# Patient Record
Sex: FEMALE | Race: WHITE | HISPANIC OR LATINO | Employment: FULL TIME | URBAN - METROPOLITAN AREA
[De-identification: names, ages, dates, MRNs, and addresses within clinical notes are randomized per-mention and may not be internally consistent; named-entity substitution may affect disease eponyms.]

---

## 2017-01-17 ENCOUNTER — HOSPITAL ENCOUNTER (EMERGENCY)
Facility: HOSPITAL | Age: 37
Discharge: HOME | End: 2017-01-17
Admitting: EMERGENCY MEDICINE
Payer: MEDICARE

## 2017-01-17 VITALS
TEMPERATURE: 98.1 F | WEIGHT: 176.8 LBS | RESPIRATION RATE: 18 BRPM | OXYGEN SATURATION: 96 % | SYSTOLIC BLOOD PRESSURE: 132 MMHG | HEART RATE: 104 BPM | DIASTOLIC BLOOD PRESSURE: 78 MMHG

## 2017-01-17 DIAGNOSIS — N39.0 UTI (URINARY TRACT INFECTION): Primary | ICD-10-CM

## 2017-01-17 LAB
BACTERIA UR QL AUTO: ABNORMAL /HPF
BILIRUB UR QL STRIP: ABNORMAL
CLARITY UR: CLEAR
COLOR UR: ABNORMAL
GLUCOSE UR STRIP-MCNC: ABNORMAL MG/DL
HCG UR QL: NEGATIVE
HGB UR QL STRIP.AUTO: ABNORMAL
KETONES UR STRIP-MCNC: ABNORMAL MG/DL
LEUKOCYTE ESTERASE UR QL STRIP: ABNORMAL
NITRITE UR QL STRIP: POSITIVE
NON-SQ EPI CELLS URNS QL MICRO: ABNORMAL /HPF
PH UR STRIP.AUTO: 5 [PH] (ref 4.5–8)
PROT UR STRIP-MCNC: >=300 MG/DL
RBC #/AREA URNS AUTO: ABNORMAL /HPF
SP GR UR STRIP.AUTO: 1.02 (ref 1–1.03)
UROBILINOGEN UR QL STRIP.AUTO: 4 E.U./DL
WBC #/AREA URNS AUTO: ABNORMAL /HPF

## 2017-01-17 PROCEDURE — 81025 URINE PREGNANCY TEST: CPT

## 2017-01-17 PROCEDURE — 81001 URINALYSIS AUTO W/SCOPE: CPT

## 2017-01-17 PROCEDURE — 87086 URINE CULTURE/COLONY COUNT: CPT

## 2017-01-17 PROCEDURE — 87147 CULTURE TYPE IMMUNOLOGIC: CPT

## 2017-01-17 PROCEDURE — 99283 EMERGENCY DEPT VISIT LOW MDM: CPT

## 2017-01-17 PROCEDURE — 81002 URINALYSIS NONAUTO W/O SCOPE: CPT

## 2017-01-17 RX ORDER — PHENAZOPYRIDINE HYDROCHLORIDE 100 MG/1
100 TABLET, FILM COATED ORAL 3 TIMES DAILY PRN
Qty: 6 TABLET | Refills: 0 | Status: SHIPPED | OUTPATIENT
Start: 2017-01-17 | End: 2017-01-19

## 2017-01-17 RX ORDER — CEPHALEXIN 500 MG/1
500 CAPSULE ORAL 4 TIMES DAILY
Qty: 20 CAPSULE | Refills: 0 | Status: SHIPPED | OUTPATIENT
Start: 2017-01-17 | End: 2017-01-22

## 2017-01-18 LAB — BACTERIA UR CULT: NORMAL

## 2017-06-08 ENCOUNTER — HOSPITAL ENCOUNTER (EMERGENCY)
Facility: HOSPITAL | Age: 37
Discharge: HOME | End: 2017-06-08
Attending: EMERGENCY MEDICINE | Admitting: EMERGENCY MEDICINE
Payer: MEDICARE

## 2017-06-08 ENCOUNTER — APPOINTMENT (EMERGENCY)
Dept: RADIOLOGY | Facility: HOSPITAL | Age: 37
End: 2017-06-08
Payer: MEDICARE

## 2017-06-08 VITALS
HEART RATE: 109 BPM | DIASTOLIC BLOOD PRESSURE: 69 MMHG | OXYGEN SATURATION: 100 % | RESPIRATION RATE: 18 BRPM | SYSTOLIC BLOOD PRESSURE: 139 MMHG | WEIGHT: 171.8 LBS | TEMPERATURE: 98.5 F

## 2017-06-08 DIAGNOSIS — R07.89 MUSCULOSKELETAL CHEST PAIN: ICD-10-CM

## 2017-06-08 DIAGNOSIS — L40.9 PSORIASIS: Primary | ICD-10-CM

## 2017-06-08 LAB
ATRIAL RATE: 98 BPM
P AXIS: 64 DEGREES
PR INTERVAL: 148 MS
QRS AXIS: 30 DEGREES
QRSD INTERVAL: 80 MS
QT INTERVAL: 352 MS
QTC INTERVAL: 449 MS
SPECIMEN SOURCE: NORMAL
T WAVE AXIS: 26 DEGREES
TROPONIN I BLD-MCNC: 0 NG/ML (ref 0–0.08)
VENTRICULAR RATE: 98 BPM

## 2017-06-08 PROCEDURE — 71020 HB CHEST X-RAY 2VW FRONTAL&LATL: CPT

## 2017-06-08 PROCEDURE — 93005 ELECTROCARDIOGRAM TRACING: CPT | Performed by: EMERGENCY MEDICINE

## 2017-06-08 PROCEDURE — 99284 EMERGENCY DEPT VISIT MOD MDM: CPT

## 2017-06-08 PROCEDURE — 84484 ASSAY OF TROPONIN QUANT: CPT

## 2017-06-08 RX ORDER — ACETAMINOPHEN 325 MG/1
650 TABLET ORAL ONCE
Status: DISCONTINUED | OUTPATIENT
Start: 2017-06-08 | End: 2017-06-08 | Stop reason: HOSPADM

## 2017-06-08 RX ORDER — CETIRIZINE HYDROCHLORIDE 5 MG/1
5 TABLET ORAL DAILY
Qty: 30 TABLET | Refills: 0 | Status: SHIPPED | OUTPATIENT
Start: 2017-06-08 | End: 2018-04-14

## 2017-06-08 RX ORDER — DIAPER,BRIEF,INFANT-TODD,DISP
1 EACH MISCELLANEOUS 2 TIMES DAILY
Qty: 30 G | Refills: 0 | Status: SHIPPED | OUTPATIENT
Start: 2017-06-08 | End: 2018-04-14

## 2018-04-14 ENCOUNTER — HOSPITAL ENCOUNTER (EMERGENCY)
Facility: HOSPITAL | Age: 38
Discharge: HOME | End: 2018-04-14
Attending: EMERGENCY MEDICINE | Admitting: EMERGENCY MEDICINE
Payer: MEDICARE

## 2018-04-14 VITALS
TEMPERATURE: 97.8 F | HEART RATE: 99 BPM | SYSTOLIC BLOOD PRESSURE: 116 MMHG | OXYGEN SATURATION: 99 % | WEIGHT: 160 LBS | RESPIRATION RATE: 20 BRPM | DIASTOLIC BLOOD PRESSURE: 56 MMHG

## 2018-04-14 DIAGNOSIS — L23.9 ALLERGIC DERMATITIS: Primary | ICD-10-CM

## 2018-04-14 LAB — EXT PREG TEST URINE: NORMAL

## 2018-04-14 PROCEDURE — 81025 URINE PREGNANCY TEST: CPT | Performed by: PHYSICIAN ASSISTANT

## 2018-04-14 PROCEDURE — 99283 EMERGENCY DEPT VISIT LOW MDM: CPT

## 2018-04-14 RX ORDER — PREDNISONE 20 MG/1
60 TABLET ORAL ONCE
Status: DISCONTINUED | OUTPATIENT
Start: 2018-04-14 | End: 2018-04-14 | Stop reason: HOSPADM

## 2018-04-14 RX ORDER — TRIAMCINOLONE ACETONIDE 5 MG/G
1 CREAM TOPICAL 3 TIMES DAILY
Qty: 30 G | Refills: 0 | Status: SHIPPED | OUTPATIENT
Start: 2018-04-14 | End: 2018-08-20 | Stop reason: ALTCHOICE

## 2018-04-14 RX ORDER — DIPHENHYDRAMINE HCL 25 MG
25 TABLET ORAL ONCE
Status: DISCONTINUED | OUTPATIENT
Start: 2018-04-14 | End: 2018-04-14 | Stop reason: HOSPADM

## 2018-04-14 NOTE — ED NOTES
Went to administer medications to pt  Pt instantly became angry when informing her she will be given benadryl and prednisone  Pt reports she has these medications at home  Asked pt why she had prednisone at home as it typically is to be finished  Pt states, "Well I have like 3 left "  I want something that is gonna clear this up and points to her face  Pt asking to speak with Emily Ford PA-C  Provider aware  Pt out to nurse's station immediately asking if provider aware  Pt refusing medications and states she just wants a cream to go home with        Carol Ledbetter RN  04/14/18 2815

## 2018-04-14 NOTE — DISCHARGE INSTRUCTIONS
Contact Dermatitis   WHAT YOU NEED TO KNOW:   Contact dermatitis is a skin rash  It develops when you touch something that irritates your skin or causes an allergic reaction  DISCHARGE INSTRUCTIONS:   Call 911 for any of the following:   · You have sudden trouble breathing  · Your throat swells and you have trouble eating  · Your face is swollen  Contact your healthcare provider if:   · You have a fever  · Your blisters are draining pus  · Your rash spreads or does not get better, even after treatment  · You have questions or concerns about your condition or care  Medicines:   · Medicines  help decrease itching and swelling  They will be given as a topical medicine to apply to your rash or as a pill  · Take your medicine as directed  Contact your healthcare provider if you think your medicine is not helping or if you have side effects  Tell him or her if you are allergic to any medicine  Keep a list of the medicines, vitamins, and herbs you take  Include the amounts, and when and why you take them  Bring the list or the pill bottles to follow-up visits  Carry your medicine list with you in case of an emergency  Manage contact dermatitis:   · Take short baths or showers in cool water  Use mild soap or soap-free cleansers  Add oatmeal, baking soda, or cornstarch to the bath water to help decrease skin irritation  · Avoid skin irritants , such as makeup, hair products, soaps, and cleansers  Use products that do not contain perfume or dye  · Apply a cool compress to your rash  This will help soothe your skin  · Keep your skin moist   Rub unscented cream or lotion on your skin to prevent dryness and itching  Do this right after a bath or shower when your skin is still damp  Follow up with your healthcare provider or dermatologist in 2 to 3 days:  Write down your questions so you remember to ask them during your visits     © 2017 [employer] Information is for End User's use only and may not be sold, redistributed or otherwise used for commercial purposes  All illustrations and images included in CareNotes® are the copyrighted property of [employer]  or Robby Chan  The above information is an  only  It is not intended as medical advice for individual conditions or treatments  Talk to your doctor, nurse or pharmacist before following any medical regimen to see if it is safe and effective for you

## 2018-04-14 NOTE — ED PROVIDER NOTES
History  Chief Complaint   Patient presents with    Rash     generalized psoriasis, reports increased itchiness  Has an appointment with dermatologist this week  The patient is a 78-year-old female presents for evaluation rash  Patient states that she has a rash or a few days and is affecting her face primarily and sporadically throughout the body  The rash is acutely itchy  Denies any foods, clothing, medications, or cosmetics  Patient states she has a diagnosis of psoriasis but does not take any medications for it  None       Past Medical History:   Diagnosis Date    Blind left eye     Prosthetic eye globe        Past Surgical History:   Procedure Laterality Date    EYE SURGERY         History reviewed  No pertinent family history  I have reviewed and agree with the history as documented  Social History   Substance Use Topics    Smoking status: Current Every Day Smoker     Packs/day: 0 25     Types: Cigarettes    Smokeless tobacco: Never Used    Alcohol use Yes      Comment: occasional         Review of Systems   Constitutional: Negative for activity change, appetite change and fatigue  HENT: Negative for nosebleeds, sneezing, sore throat, trouble swallowing and voice change  Eyes: Negative for photophobia, pain and visual disturbance  Respiratory: Negative for apnea, choking and stridor  Cardiovascular: Negative for palpitations and leg swelling  Gastrointestinal: Negative for anal bleeding and constipation  Endocrine: Negative for cold intolerance, heat intolerance, polydipsia and polyphagia  Genitourinary: Negative for decreased urine volume, enuresis, frequency, genital sores and urgency  Musculoskeletal: Negative for joint swelling and myalgias  Allergic/Immunologic: Negative for environmental allergies and food allergies  Neurological: Negative for tremors, seizures, speech difficulty and weakness  Hematological: Negative for adenopathy  Psychiatric/Behavioral: Negative for behavioral problems, decreased concentration, dysphoric mood and hallucinations  Physical Exam  ED Triage Vitals [04/14/18 1531]   Temperature Pulse Respirations Blood Pressure SpO2   97 8 °F (36 6 °C) 99 20 116/56 99 %      Temp Source Heart Rate Source Patient Position - Orthostatic VS BP Location FiO2 (%)   Temporal Monitor Lying Right arm --      Pain Score       --           Orthostatic Vital Signs  Vitals:    04/14/18 1531   BP: 116/56   Pulse: 99   Patient Position - Orthostatic VS: Lying       Physical Exam   Constitutional: She is oriented to person, place, and time  She appears well-developed and well-nourished  No distress  HENT:   Head: Normocephalic and atraumatic  Right Ear: External ear normal    Left Ear: External ear normal    Nose: Nose normal    Mouth/Throat: Oropharynx is clear and moist    Eyes: Conjunctivae and EOM are normal  Pupils are equal, round, and reactive to light  Neck: Normal range of motion  Neck supple  Cardiovascular: Normal rate, regular rhythm and normal heart sounds  Exam reveals no gallop and no friction rub  No murmur heard  Pulmonary/Chest: Effort normal and breath sounds normal  No respiratory distress  She has no wheezes  Abdominal: Soft  Bowel sounds are normal    Neurological: She is alert and oriented to person, place, and time  Skin: Skin is warm and dry  Rash noted  She is not diaphoretic  There is a papular rash along the bilateral cheeks and sporadically throughout the upper extremities and a few spots on the right lower extremity  He has areas appear to be lichenified and the patient exhibits pruritus  No areas of cellulitis  Psychiatric: She has a normal mood and affect  Her behavior is normal    Vitals reviewed        ED Medications  Medications - No data to display    Diagnostic Studies  Results Reviewed     Procedure Component Value Units Date/Time    POCT pregnancy, urine [[id number]]  (Normal) Resulted:  04/14/18 1547    Lab Status:  Final result Updated:  04/14/18 1547     EXT PREG TEST UR (Ref: Negative) Negative (-)                 No orders to display              Procedures  Procedures       Phone Contacts  ED Phone Contact    ED Course  ED Course                                MDM  CritCare Time    Disposition  Final diagnoses: Allergic dermatitis     Time reflects when diagnosis was documented in both MDM as applicable and the Disposition within this note     Time User Action Codes Description Comment    4/14/2018  3:45 PM Laretta Gouty V Add [T78 40XA] Allergic reaction     4/14/2018  3:45 PM Laretta Gouty V Remove [T78 40XA] Allergic reaction     4/14/2018  3:45 PM Hank uCrtis Add [L23 9] Allergic dermatitis       ED Disposition     None      Follow-up Information    None       Patient's Medications   Discharge Prescriptions    No medications on file     No discharge procedures on file      ED Provider  Electronically Signed by           Darren Cobos PA-C  04/14/18 4672

## 2018-08-12 ENCOUNTER — HOSPITAL ENCOUNTER (EMERGENCY)
Facility: HOSPITAL | Age: 38
Discharge: HOME | End: 2018-08-12
Attending: EMERGENCY MEDICINE
Payer: MEDICARE

## 2018-08-12 VITALS
HEIGHT: 64 IN | RESPIRATION RATE: 17 BRPM | TEMPERATURE: 97.2 F | BODY MASS INDEX: 28.85 KG/M2 | WEIGHT: 169 LBS | DIASTOLIC BLOOD PRESSURE: 78 MMHG | OXYGEN SATURATION: 100 % | SYSTOLIC BLOOD PRESSURE: 112 MMHG | HEART RATE: 69 BPM

## 2018-08-12 DIAGNOSIS — L23.9 ALLERGIC DERMATITIS: Primary | ICD-10-CM

## 2018-08-12 PROCEDURE — 99282 EMERGENCY DEPT VISIT SF MDM: CPT

## 2018-08-12 RX ORDER — PREDNISONE 20 MG/1
20 TABLET ORAL DAILY
Qty: 15 TABLET | Refills: 0 | Status: SHIPPED | OUTPATIENT
Start: 2018-08-12 | End: 2018-08-20 | Stop reason: ALTCHOICE

## 2018-08-12 RX ORDER — HYDROXYZINE HYDROCHLORIDE 25 MG/1
25 TABLET, FILM COATED ORAL EVERY 6 HOURS PRN
Qty: 12 TABLET | Refills: 0 | Status: SHIPPED | OUTPATIENT
Start: 2018-08-12 | End: 2018-08-20 | Stop reason: ALTCHOICE

## 2018-08-12 NOTE — DISCHARGE INSTRUCTIONS
Dermatitis   WHAT YOU NEED TO KNOW:   Dermatitis is skin inflammation  You may have an itchy rash, redness, or swelling  You may also have bumps or blisters that crust over or ooze clear fluid  Dermatitis can be caused by allergens such as dust mites, pet dander, pollen, and certain foods  It can also develop when something touches your skin and irritates it or causes an allergic reaction  Examples include soaps, chemicals, latex, and poison ivy  DISCHARGE INSTRUCTIONS:   Call 911 if you have any of the following symptoms of anaphylaxis:   · Sudden trouble breathing    · Throat swelling and tightness    · Dizziness, lightheadedness, fainting, or confusion  Return to the emergency department if:   · You develop a fever or have red streaks going up your arm or leg  · Your rash gets more swollen, red, or hot  Contact your healthcare provider if:   · Your skin blisters, oozes white or yellow pus, or has a foul-smelling discharge  · Your rash spreads or does not get better, even after treatment  · You have questions or concerns about your condition or care  Medicines:   · Medicines  help decrease itching and inflammation, or treat a bacterial infection  They may be given as a topical cream, shot, or a pill  · Take your medicine as directed  Contact your healthcare provider if you think your medicine is not helping or if you have side effects  Tell him of her if you are allergic to any medicine  Keep a list of the medicines, vitamins, and herbs you take  Include the amounts, and when and why you take them  Bring the list or the pill bottles to follow-up visits  Carry your medicine list with you in case of an emergency  Apply a cool compress to your rash: This will help soothe your skin  Keep your skin moist:  Rub unscented cream or lotion on your skin to prevent dryness and itching  Do this right after a lukewarm bath or shower when your skin is still damp    Avoid skin irritants:  Do not use skin irritants, such as makeup, hair products, soaps, and cleansers  Use products that do not contain fragrances or dye  Follow up with your healthcare provider as directed:  Write down your questions so you remember to ask them during your visits  © 2017 [employer] Information is for End User's use only and may not be sold, redistributed or otherwise used for commercial purposes  All illustrations and images included in CareNotes® are the copyrighted property of [employer]  or Robby Chan  The above information is an  only  It is not intended as medical advice for individual conditions or treatments  Talk to your doctor, nurse or pharmacist before following any medical regimen to see if it is safe and effective for you  Acute Rash   WHAT YOU NEED TO KNOW:   A rash is irritation, redness, or itchiness in the skin or mucus membranes  Mucus membranes are areas such as the lining of your nose or throat  Acute means the rash starts suddenly, worsens quickly, and lasts a short time  An acute rash may be caused by a disease, such as hepatitis or vasculitis  The rash may be a reaction to something you are allergic to, such as certain foods, or latex  Certain medicines, including antibiotics, NSAIDs, prescription pain medicine, and aspirin can also cause a rash  DISCHARGE INSTRUCTIONS:   Return to the emergency department if:   · You have sudden trouble breathing or chest pain  · You are vomiting, have a headache or muscle aches, and your throat hurts  Contact your healthcare provider if:   · You have a fever  · You get open wounds from scratching your skin, or you have a wound that is red, swollen, or painful  · Your rash lasts longer than 3 months  · You have swelling or pain in your joints  · You have questions or concerns about your condition or care    Medicines:  If your rash does not go away on its own, you may need the following medicines:  · Antihistamines  may be given to help decrease itching  · Steroids  may be given to decrease inflammation  · Antibiotics  help fight or prevent a bacterial infection  · Take your medicine as directed  Contact your healthcare provider if you think your medicine is not helping or if you have side effects  Tell him of her if you are allergic to any medicine  Keep a list of the medicines, vitamins, and herbs you take  Include the amounts, and when and why you take them  Bring the list or the pill bottles to follow-up visits  Carry your medicine list with you in case of an emergency  Prevent a rash or care for your skin when you have a rash:  Dry skin can lead to more problems  Do not scratch your skin if it itches  You may cause a skin infection by scratching  The following may prevent dry skin, and help your skin look better:  · Use thick cream lotions or petroleum jelly to help soothe your rash  These products work well on areas with thick skin, such as your feet  Cool compresses may also be used to soothe your skin  Apply a cool compress or a cool, wet towel, and then cover it with a dry towel  · Use lukewarm water when you bathe  Hot water may damage your skin more  Pat your skin dry  Do not rub your skin with a towel  · Use detergents, soaps, shampoos, and bubble baths made for sensitive skin  Wear clothes that are made of cotton instead of nylon or wool  Cotton is softer, so it will not hurt your skin as much  Follow up with your healthcare provider as directed: You may need to see a dermatologist if healthcare providers do not know what is causing your rash  You may also need to see a dermatologist if your rash does not get better even with treatment  You may need to see a dietitian if you have allergies to foods  Write down your questions so you remember to ask them during your visits    © 2017 [employer] Information is for End User's use only and may not be sold, redistributed or otherwise used for commercial purposes  All illustrations and images included in CareNotes® are the copyrighted property of [employer]  or [employer]by Chan  The above information is an  only  It is not intended as medical advice for individual conditions or treatments  Talk to your doctor, nurse or pharmacist before following any medical regimen to see if it is safe and effective for you

## 2018-08-12 NOTE — ED PROVIDER NOTES
History  Chief Complaint   Patient presents with    Rash     I have this rash to my face since last week  Started after I was swimming in a public pool  I did wash my face at home, before the rash broke out  It feels like a chapped , itchy sensation  My eyes swelled up today  I have no problems breathig of swallowing  76-year-old female presents with complaint of allergic reaction to her face  She states that this began after swimming and is unsure what else could have come in contact with her face to cause this  She had a similar episode back in April when she was diagnosed with allergic dermatitis  She has a history of psoriasis and thought that that was what was going on at 1st   There is 1 area that she picked that to try to get it to open with no real drainage  She denies any difficulty breathing, wheezing, shortness of breath, GI symptoms, fever, difficulty swallowing, or other issues at this time  She does report some improvement after taking Benadryl and applying ice to the area  Allergic Reaction   Presenting symptoms: itching and rash    Presenting symptoms: no difficulty breathing, no difficulty swallowing and no wheezing    Severity:  Moderate  Prior allergic episodes:  Unable to specify  Relieved by: Antihistamines  Worsened by:  Nothing  Ineffective treatments:  Cold compresses and antihistamines      Prior to Admission Medications   Prescriptions Last Dose Informant Patient Reported? Taking?   triamcinolone (KENALOG) 0 5 % cream   No No   Sig: Apply 1 application topically 3 (three) times a day      Facility-Administered Medications: None       Past Medical History:   Diagnosis Date    Blind left eye     Prosthetic eye globe        Past Surgical History:   Procedure Laterality Date    EYE SURGERY         No family history on file  I have reviewed and agree with the history as documented      Social History   Substance Use Topics    Smoking status: Current Every Day Smoker Packs/day: 0 25     Types: Cigarettes    Smokeless tobacco: Never Used    Alcohol use Yes      Comment: occasional         Review of Systems   Constitutional: Negative for appetite change, chills, fatigue and fever  HENT: Negative for postnasal drip, sinus pain and trouble swallowing  Eyes: Negative for redness and itching  Respiratory: Negative for chest tightness, shortness of breath and wheezing  Cardiovascular: Negative for chest pain and leg swelling  Gastrointestinal: Negative for abdominal pain, constipation, diarrhea, nausea and vomiting  Endocrine: Negative  Genitourinary: Negative for difficulty urinating and dysuria  Musculoskeletal: Negative for back pain and myalgias  Skin: Positive for itching and rash  Allergic/Immunologic: Negative  Neurological: Negative for dizziness, numbness and headaches  Hematological: Negative  Psychiatric/Behavioral: Negative  Physical Exam  Physical Exam   Constitutional: She is oriented to person, place, and time  She appears well-developed and well-nourished  HENT:   Head: Normocephalic and atraumatic  Nose: Nose normal    Mouth/Throat: Oropharynx is clear and moist    Eyes: Conjunctivae and EOM are normal  Pupils are equal, round, and reactive to light  Neck: Normal range of motion  Neck supple  Cardiovascular: Normal rate, regular rhythm and normal heart sounds  Pulmonary/Chest: Effort normal and breath sounds normal  No respiratory distress  She has no wheezes  Abdominal: Soft  Bowel sounds are normal  There is no tenderness  There is no guarding  Musculoskeletal: She exhibits no edema, tenderness or deformity  Neurological: She is alert and oriented to person, place, and time  Skin: Skin is warm and dry  Capillary refill takes less than 2 seconds  Rash noted  No abrasion, no bruising and no ecchymosis noted  Rash is urticarial  Rash is not vesicular  No erythema          Psychiatric: She has a normal mood and affect  Her behavior is normal    Nursing note and vitals reviewed  Vital Signs  ED Triage Vitals [08/12/18 1250]   Temperature Pulse Respirations Blood Pressure SpO2   (!) 97 2 °F (36 2 °C) 78 16 110/73 (!) 8 %      Temp Source Heart Rate Source Patient Position - Orthostatic VS BP Location FiO2 (%)   Temporal Monitor Sitting Left arm --      Pain Score       No Pain           Vitals:    08/12/18 1250   BP: 110/73   Pulse: 78   Patient Position - Orthostatic VS: Sitting       Visual Acuity      ED Medications  Medications - No data to display    Diagnostic Studies  Results Reviewed     None                 No orders to display              Procedures  Procedures       Phone Contacts  ED Phone Contact    ED Course                               MDM  Number of Diagnoses or Management Options  Diagnosis management comments: Patient was concerned that she could have syphilis as her boyfriend had Googled that and was making fun of her  Patient denies any vaginal complaints whatsoever and denies any possibility of her having an STD  Discussed the high likelihood given her past history that this is again allergic dermatitis and that it should respond to steroids  There are no signs of active cellulitis and therefore we will not initiate antibiotic therapy at this time  Discussed reasons to return to the emergency department including transition to a cellulitis or development of new or worsening symptoms  CritCare Time    Disposition  Final diagnoses:   None     ED Disposition     None      Follow-up Information    None         Patient's Medications   Discharge Prescriptions    No medications on file     No discharge procedures on file      ED Provider  Electronically Signed by           Lyudmila Stark DO  08/12/18 9456

## 2018-08-20 ENCOUNTER — HOSPITAL ENCOUNTER (EMERGENCY)
Facility: HOSPITAL | Age: 38
Discharge: HOME | End: 2018-08-20
Attending: EMERGENCY MEDICINE
Payer: MEDICARE

## 2018-08-20 VITALS
RESPIRATION RATE: 16 BRPM | DIASTOLIC BLOOD PRESSURE: 68 MMHG | BODY MASS INDEX: 28.34 KG/M2 | WEIGHT: 166 LBS | OXYGEN SATURATION: 99 % | SYSTOLIC BLOOD PRESSURE: 115 MMHG | TEMPERATURE: 98 F | HEART RATE: 88 BPM | HEIGHT: 64 IN

## 2018-08-20 DIAGNOSIS — R21 SKIN RASH: Primary | ICD-10-CM

## 2018-08-20 LAB — ERYTHROCYTE [SEDIMENTATION RATE] IN BLOOD: 35 MM/HOUR (ref 1–20)

## 2018-08-20 PROCEDURE — 86038 ANTINUCLEAR ANTIBODIES: CPT | Performed by: PHYSICIAN ASSISTANT

## 2018-08-20 PROCEDURE — 36415 COLL VENOUS BLD VENIPUNCTURE: CPT | Performed by: PHYSICIAN ASSISTANT

## 2018-08-20 PROCEDURE — 99283 EMERGENCY DEPT VISIT LOW MDM: CPT

## 2018-08-20 PROCEDURE — 85652 RBC SED RATE AUTOMATED: CPT | Performed by: PHYSICIAN ASSISTANT

## 2018-08-20 RX ORDER — PREDNISONE 20 MG/1
TABLET ORAL
Status: COMPLETED
Start: 2018-08-20 | End: 2018-08-20

## 2018-08-20 RX ORDER — PREDNISONE 20 MG/1
TABLET ORAL
Qty: 20 TABLET | Refills: 0 | Status: SHIPPED | OUTPATIENT
Start: 2018-08-20 | End: 2018-09-05

## 2018-08-20 RX ORDER — HYDROXYZINE PAMOATE 25 MG/1
25 CAPSULE ORAL 3 TIMES DAILY PRN
Qty: 15 CAPSULE | Refills: 0 | Status: SHIPPED | OUTPATIENT
Start: 2018-08-20 | End: 2018-09-05

## 2018-08-20 RX ORDER — PREDNISONE 20 MG/1
60 TABLET ORAL ONCE
Status: COMPLETED | OUTPATIENT
Start: 2018-08-20 | End: 2018-08-20

## 2018-08-20 RX ADMIN — PREDNISONE 60 MG: 20 TABLET ORAL at 11:10

## 2018-08-20 NOTE — ED PROVIDER NOTES
History  Chief Complaint   Patient presents with    Rash     I have this facial rash  I was here a week or so ago, and I did the steroid, it helped fo a while  I cannot relate this to anything   This is a 79-year-old female patient who was seen here several weeks ago and was treated for allergic dermatitis with a short course of steroids  Patient does have a history of eczema and she states psoriasis  She comes back with a rash on her face that is the butterfly pattern that she states burns and is itchy  She has tried to figure out if it is something that she is eating  She states many years ago she had something similar and she is given a long course of steroids improved  She denies fever chills headache blurred vision double vision cough congestion sore throat nausea vomiting diarrhea abdominal pain no chest pain or shortness of breath  No urgency frequency or dysuria  No body aches or joint swelling  Differential diagnosis includes not limited to eczema/psoriasis rebound, allergic dermatitis less likely, lupus patient be given long coarse steroid something for the itching be sent to Dermatology            None       Past Medical History:   Diagnosis Date    Blind left eye     Prosthetic eye globe        Past Surgical History:   Procedure Laterality Date    EYE SURGERY         No family history on file  I have reviewed and agree with the history as documented  Social History   Substance Use Topics    Smoking status: Current Every Day Smoker     Packs/day: 0 25     Types: Cigarettes    Smokeless tobacco: Never Used    Alcohol use Yes      Comment: occasional             Review of Systems   All other systems reviewed and are negative  Physical Exam  Physical Exam   Constitutional: She appears well-developed and well-nourished  HENT:   Head: Normocephalic and atraumatic     Right Ear: External ear normal    Left Ear: External ear normal    Nose: Nose normal    Mouth/Throat: Oropharynx is clear and moist    Eyes: Conjunctivae are normal  Pupils are equal, round, and reactive to light  Neck: Normal range of motion  Neck supple  Cardiovascular: Normal rate and regular rhythm  Pulmonary/Chest: Effort normal and breath sounds normal    Abdominal: Soft  Bowel sounds are normal  There is no tenderness  Neurological: She is alert  Skin: Skin is warm  Psychiatric: She has a normal mood and affect  Her behavior is normal    Nursing note and vitals reviewed  Vital Signs  ED Triage Vitals [08/20/18 1037]   Temperature Pulse Respirations Blood Pressure SpO2   98 °F (36 7 °C) 88 16 115/68 99 %      Temp Source Heart Rate Source Patient Position - Orthostatic VS BP Location FiO2 (%)   Temporal -- Sitting Left arm --      Pain Score       No Pain           Vitals:    08/20/18 1037   BP: 115/68   Pulse: 88   Patient Position - Orthostatic VS: Sitting       Visual Acuity      ED Medications  Medications   predniSONE tablet 60 mg (not administered)       Diagnostic Studies  Results Reviewed     Procedure Component Value Units Date/Time    Sedimentation rate, automated [[id number]] Collected:  08/20/18 1105    Lab Status:  No result Specimen:  Blood from Arm, Left     MARCO ANTONIO Screen w/ Reflex to Titer/Pattern [[id number]] Collected:  08/20/18 1105    Lab Status:  No result Specimen:  Blood from Arm, Left                  No orders to display              Procedures  Procedures       Phone Contacts  ED Phone Contact    ED Course                               MDM  CritCare Time    Disposition  Final diagnoses:   Skin rash     Time reflects when diagnosis was documented in both MDM as applicable and the Disposition within this note     Time User Action Codes Description Comment    8/20/2018 11:06 AM Luis Piedra Add [R21] Skin rash       ED Disposition     ED Disposition Condition Comment    Discharge  Dolly Painting discharge to home/self care      Condition at discharge: Good        Follow-up Information     Follow up With Specialties Details Why Contact Info    Leandra Becker MD [facility] Medicine Schedule an appointment as soon as possible for a visit  [address] [facility] 305  [address] [address]  [address]  Dermatology, Multidisciplinary Schedule an appointment as soon as possible for a visit he is a dermatologist [address] [facility] 181 [address] [facility] [address] [place] [address]            Patient's Medications   Discharge Prescriptions    HYDROXYZINE PAMOATE (VISTARIL) 25 MG CAPSULE    Take 1 capsule (25 mg total) by mouth 3 (three) times a day as needed for itching for up to 5 days       Start Date: 8/20/2018 End Date: 8/25/2018       Order Dose: 25 mg       Quantity: 15 capsule    Refills: 0    PREDNISONE 20 MG TABLET    50 [id number], [id number]       Start Date: 8/20/2018 End Date: --       Order Dose: --       Quantity: 20 tablet    Refills: 0     No discharge procedures on file      ED Provider  Electronically Signed by           Daniela Morel PA-C  08/20/18 6544

## 2018-08-20 NOTE — DISCHARGE INSTRUCTIONS
Acute Rash   WHAT YOU NEED TO KNOW:   A rash is irritation, redness, or itchiness in the skin or mucus membranes  Mucus membranes are areas such as the lining of your nose or throat  Acute means the rash starts suddenly, worsens quickly, and lasts a short time  An acute rash may be caused by a disease, such as hepatitis or vasculitis  The rash may be a reaction to something you are allergic to, such as certain foods, or latex  Certain medicines, including antibiotics, NSAIDs, prescription pain medicine, and aspirin can also cause a rash  DISCHARGE INSTRUCTIONS:   Return to the emergency department if:   · You have sudden trouble breathing or chest pain  · You are vomiting, have a headache or muscle aches, and your throat hurts  Contact your healthcare provider if:   · You have a fever  · You get open wounds from scratching your skin, or you have a wound that is red, swollen, or painful  · Your rash lasts longer than 3 months  · You have swelling or pain in your joints  · You have questions or concerns about your condition or care  Medicines:  If your rash does not go away on its own, you may need the following medicines:  · Antihistamines  may be given to help decrease itching  · Steroids  may be given to decrease inflammation  · Antibiotics  help fight or prevent a bacterial infection  · Take your medicine as directed  Contact your healthcare provider if you think your medicine is not helping or if you have side effects  Tell him of her if you are allergic to any medicine  Keep a list of the medicines, vitamins, and herbs you take  Include the amounts, and when and why you take them  Bring the list or the pill bottles to follow-up visits  Carry your medicine list with you in case of an emergency  Prevent a rash or care for your skin when you have a rash:  Dry skin can lead to more problems  Do not scratch your skin if it itches  You may cause a skin infection by scratching   The following may prevent dry skin, and help your skin look better:  · Use thick cream lotions or petroleum jelly to help soothe your rash  These products work well on areas with thick skin, such as your feet  Cool compresses may also be used to soothe your skin  Apply a cool compress or a cool, wet towel, and then cover it with a dry towel  · Use lukewarm water when you bathe  Hot water may damage your skin more  Pat your skin dry  Do not rub your skin with a towel  · Use detergents, soaps, shampoos, and bubble baths made for sensitive skin  Wear clothes that are made of cotton instead of nylon or wool  Cotton is softer, so it will not hurt your skin as much  Follow up with your healthcare provider as directed: You may need to see a dermatologist if healthcare providers do not know what is causing your rash  You may also need to see a dermatologist if your rash does not get better even with treatment  You may need to see a dietitian if you have allergies to foods  Write down your questions so you remember to ask them during your visits  © 2017 [facility] [facility] Information is for End User's use only and may not be sold, redistributed or otherwise used for commercial purposes  All illustrations and images included in CareNotes® are the copyrighted property of [employer]  or Robby Chan  The above information is an  only  It is not intended as medical advice for individual conditions or treatments  Talk to your doctor, nurse or pharmacist before following any medical regimen to see if it is safe and effective for you

## 2018-08-22 PROBLEM — R21 SKIN RASH: Status: ACTIVE | Noted: 2018-08-22

## 2018-08-22 LAB — RYE IGE QN: NEGATIVE

## 2018-09-05 ENCOUNTER — OFFICE VISIT (OUTPATIENT)
Dept: FAMILY MEDICINE CLINIC | Facility: CLINIC | Age: 38
End: 2018-09-05
Payer: MEDICARE

## 2018-09-05 VITALS
TEMPERATURE: 98.2 F | DIASTOLIC BLOOD PRESSURE: 86 MMHG | RESPIRATION RATE: 16 BRPM | WEIGHT: 166 LBS | OXYGEN SATURATION: 98 % | BODY MASS INDEX: 28.34 KG/M2 | HEART RATE: 96 BPM | SYSTOLIC BLOOD PRESSURE: 118 MMHG | HEIGHT: 64 IN

## 2018-09-05 DIAGNOSIS — L70.9 ADULT ACNE: Primary | ICD-10-CM

## 2018-09-05 PROBLEM — Z71.6 TOBACCO ABUSE COUNSELING: Status: ACTIVE | Noted: 2018-09-05

## 2018-09-05 PROCEDURE — 99213 OFFICE O/P EST LOW 20 MIN: CPT | Performed by: FAMILY MEDICINE

## 2018-09-05 RX ORDER — DOXYCYCLINE HYCLATE 100 MG/1
100 CAPSULE ORAL EVERY 12 HOURS SCHEDULED
Qty: 60 CAPSULE | Refills: 0 | Status: SHIPPED | OUTPATIENT
Start: 2018-09-05 | End: 2018-09-15

## 2018-09-05 RX ORDER — PREDNISONE 20 MG/1
40 TABLET ORAL DAILY
Qty: 5 TABLET | Refills: 0 | Status: SHIPPED | OUTPATIENT
Start: 2018-09-05 | End: 2018-09-10

## 2018-09-05 NOTE — PROGRESS NOTES
Assessment/Plan:    Adult acne  - Vibramycin 100mg BID x 10 days and then daily until symptoms resolve  - Prednisone 40mg QD x 5 days  - Sunscreen with SPF 50 and above  - Wear wide brimmed hat and long sleeve clothes when going out      Tobacco abuse counseling  - Counseled against harmful affects of smoking  Offered assistance with cessation however patient is not interested in quitting at this time  Diagnoses and all orders for this visit:    Adult acne  -     doxycycline hyclate (VIBRAMYCIN) 100 mg capsule; Take 1 capsule (100 mg total) by mouth every 12 (twelve) hours for 10 days 1 capsule twice daily x 10 days and then once daily  -     predniSONE 20 mg tablet; Take 2 tablets (40 mg total) by mouth daily for 5 days          Subjective:      Patient ID: Poli Rogers is a 43 y o  female  Rash  Patient presents for evaluation of a rash involving the face  Rash started 4 months ago  Lesions are red, and raised in texture  Rash has not changed over time  Rash is painful and is pruritic  Associated symptoms: myalgia, irritability  Patient denies: abdominal pain, arthralgia, cough, fever and nausea  Patient has not had contacts with similar rash  Patient has not had new exposures (soaps, lotions, laundry detergents, foods, medications, plants, insects or animals)  The following portions of the patient's history were reviewed and updated as appropriate: allergies, current medications, past family history, past medical history, past social history, past surgical history and problem list     Review of Systems   Constitutional: Negative for chills and fever  Respiratory: Negative for cough and shortness of breath  Cardiovascular: Negative for chest pain and palpitations  Endocrine: Negative for polyphagia and polyuria  Musculoskeletal: Negative for back pain  Skin: Positive for rash  Neurological: Negative for light-headedness, numbness and headaches            Objective:      BP 118/86 (BP Location: Right arm, Patient Position: Sitting, Cuff Size: Standard)   Pulse 96   Temp 98 2 °F (36 8 °C) (Temporal)   Resp 16   Ht 5' 4" (1 626 m)   Wt 75 3 kg (166 lb)   SpO2 98%   BMI 28 49 kg/m²          Physical Exam   Constitutional: She is oriented to person, place, and time  She appears well-developed and well-nourished  No distress  HENT:   Head: Normocephalic and atraumatic  Eyes: EOM are normal    Neurological: She is alert and oriented to person, place, and time     Skin:

## 2018-09-05 NOTE — ASSESSMENT & PLAN NOTE
- Counseled against harmful affects of smoking  Offered assistance with cessation however patient is not interested in quitting at this time

## 2018-09-05 NOTE — PATIENT INSTRUCTIONS
Sunscreen (On the skin)   May help protect your skin from sunburn, skin cancer, and other skin damages caused by the sun  Brand Name(s): Aveeno Active Naturals Absolutely Ageless w/SPF30, Aveeno Active Naturals Hydrosport Wet Skin, Aveeno Active Naturals Natural Protection Sunscreen, Aveeno Active Naturals Positively Radiant, Aveeno Active Naturals Protect + Hydrate, Aveeno ActiveNaturals SmartEsentials NourshngMoist, Aveeno Baby Continuous Protection Sunblock Lotion, Aveeno Baby Natural Protection, Aveeno Baby Natural Protection Mineral Block, Aveeno Daily Moisturizing Lotion With Sunscreen, Aveeno Positively Radiant Daily Moisturizer SPF30, Aveeno Ultra-Calming Daily Moisturizer, Avidoxy DK, Baby Lizard, Blue Lizard   There may be other brand names for this medicine  When This Medicine Should Not Be Used:   Talk with your doctor if you have had an allergic reaction to a sunscreen  Even if you have had an allergic reaction to one sunscreen, you still may be able to use another sunscreen that contains different ingredients  How to Use This Medicine:   Cream, Gel/Jelly, Liquid, Ointment, Spray, Stick  · Most sunscreens are applied about 15 to 30 minutes before you go out into the sun  Sunscreens that contain PABA should be used about 1 to 2 hours before sun exposure  There are many different kinds of sunscreens, so instructions may change with each product  Always read and follow the instructions on the package label  · Use the sunscreen on your skin only  Do not get it in your eyes, nose, or mouth  Do not use the spray on your face  A sunscreen stick or lip balm especially made for your face is easy to use  · Apply a thick layer of sunscreen to all skin areas exposed to the sun  Reapply the sunscreen at least every 2 hours or more often, or after swimming, towel drying, or heavy sweating  · If the sunscreen contains alcohol, you should not use it near a fire or if you are smoking    How to Store and [employer] of This Medicine:   · Store the sunscreen at room temperature away from heat and direct light  Do not freeze  · Ask your pharmacist or doctor how to dispose of the medicine container and any leftover or  medicine  · Keep all medicine out of the reach of children  Never share your medicine with anyone  Drugs and Foods to Avoid:   Ask your doctor or pharmacist before using any other medicine, including over-the-counter medicines, vitamins, and herbal products  · Talk with your doctor before using a sunscreen on the same skin areas you are treating with other skin medicines  Warnings While Using This Medicine:   · You should not use sunscreens on children younger than 10months of age without checking first with your child's doctor  Children younger than 10months of age may absorb sunscreen through their skin into their bodies  It is best to keep babies younger than 10months of age out of the sun  If this cannot be avoided, lightweight clothes and a hat may help protect a baby from the sun  · Sunscreens work by either absorbing or reflecting the sun's ultraviolet radiation (UVR)  UVR causes skin cancer, sunburns, and premature aging of the skin (wrinkles and dry, thinning skin)  Clouds will filter some UVR, but not all  You will still need to use sunscreen even when it is cloudy  · UVR can reflect off of light surfaces such as sand and snow  Wear a sunscreen when you are out in the snow to keep from getting a sunburn  · Sunscreens are rated by SPFs (sun protection factors)  Use a sunscreen with an SPF of at least 15  Children older than 10months of age should wear a sunscreen of broad spectrum and with SPF 13 or higher when outdoors  If you need more protection or have fair skin, an SPF of 20 to 30 (or higher) can be used  You can also protect your skin by wearing a hat, sunglasses, and lightweight clothes    · To prevent skin damage, avoid being in the sun between the hours of 10 AM and 3 PM, when the sun's rays are strongest   · Sunscreens that contain PABA may permanently stain your clothing yellow  Possible Side Effects While Using This Medicine:   Call your doctor right away if you notice any of these side effects:  · Allergic reaction: Itching or hives, swelling in your face or hands, swelling or tingling in your mouth or throat, chest tightness, trouble breathing  If you notice these less serious side effects, talk with your doctor:   · Rash  If you notice other side effects that you think are caused by this medicine, tell your doctor  Call your doctor for medical advice about side effects  You may report side effects to FDA at 2-894-FDA-7727  © 2017 [address] [employer] Information is for End User's use only and may not be sold, redistributed or otherwise used for commercial purposes  The above information is an  only  It is not intended as medical advice for individual conditions or treatments  Talk to your doctor, nurse or pharmacist before following any medical regimen to see if it is safe and effective for you

## 2018-09-05 NOTE — ASSESSMENT & PLAN NOTE
- Vibramycin 100mg BID x 10 days and then daily until symptoms resolve  - Prednisone 40mg QD x 5 days  - Sunscreen with SPF 50 and above  - Wear wide brimmed hat and long sleeve clothes when going out

## 2018-10-09 ENCOUNTER — HOSPITAL ENCOUNTER (EMERGENCY)
Facility: HOSPITAL | Age: 38
Discharge: HOME | End: 2018-10-09
Attending: EMERGENCY MEDICINE
Payer: MEDICARE

## 2018-10-09 VITALS
HEART RATE: 97 BPM | HEIGHT: 64 IN | WEIGHT: 174.6 LBS | RESPIRATION RATE: 18 BRPM | TEMPERATURE: 96.8 F | SYSTOLIC BLOOD PRESSURE: 109 MMHG | DIASTOLIC BLOOD PRESSURE: 69 MMHG | OXYGEN SATURATION: 100 % | BODY MASS INDEX: 29.81 KG/M2

## 2018-10-09 DIAGNOSIS — B37.9 YEAST INFECTION: ICD-10-CM

## 2018-10-09 DIAGNOSIS — N39.0 UTI (URINARY TRACT INFECTION): Primary | ICD-10-CM

## 2018-10-09 LAB
BACTERIA UR QL AUTO: ABNORMAL /HPF
BILIRUB UR QL STRIP: NEGATIVE
CLARITY UR: ABNORMAL
COLOR UR: YELLOW
EXT PREG TEST URINE: NORMAL
GLUCOSE UR STRIP-MCNC: NEGATIVE MG/DL
HGB UR QL STRIP.AUTO: 25
KETONES UR STRIP-MCNC: NEGATIVE MG/DL
LEUKOCYTE ESTERASE UR QL STRIP: 100
NITRITE UR QL STRIP: NEGATIVE
NON-SQ EPI CELLS URNS QL MICRO: ABNORMAL /HPF
PH UR STRIP.AUTO: 6 [PH] (ref 4.5–8)
PROT UR STRIP-MCNC: NEGATIVE MG/DL
RBC #/AREA URNS AUTO: ABNORMAL /HPF
SP GR UR STRIP.AUTO: 1.01 (ref 1–1.04)
UROBILINOGEN UA: NEGATIVE MG/DL
WBC #/AREA URNS AUTO: ABNORMAL /HPF

## 2018-10-09 PROCEDURE — 81003 URINALYSIS AUTO W/O SCOPE: CPT | Performed by: EMERGENCY MEDICINE

## 2018-10-09 PROCEDURE — 81001 URINALYSIS AUTO W/SCOPE: CPT | Performed by: EMERGENCY MEDICINE

## 2018-10-09 PROCEDURE — 99283 EMERGENCY DEPT VISIT LOW MDM: CPT

## 2018-10-09 PROCEDURE — 81025 URINE PREGNANCY TEST: CPT | Performed by: EMERGENCY MEDICINE

## 2018-10-09 RX ORDER — CEPHALEXIN 500 MG/1
500 CAPSULE ORAL ONCE
Status: COMPLETED | OUTPATIENT
Start: 2018-10-09 | End: 2018-10-09

## 2018-10-09 RX ORDER — FLUCONAZOLE 100 MG/1
TABLET ORAL
Status: COMPLETED
Start: 2018-10-09 | End: 2018-10-09

## 2018-10-09 RX ORDER — FLUCONAZOLE 100 MG/1
200 TABLET ORAL ONCE
Status: COMPLETED | OUTPATIENT
Start: 2018-10-09 | End: 2018-10-09

## 2018-10-09 RX ORDER — CEPHALEXIN 500 MG/1
500 CAPSULE ORAL EVERY 8 HOURS SCHEDULED
Qty: 9 CAPSULE | Refills: 0 | Status: SHIPPED | OUTPATIENT
Start: 2018-10-09 | End: 2018-10-12

## 2018-10-09 RX ORDER — CEPHALEXIN 500 MG/1
CAPSULE ORAL
Status: COMPLETED
Start: 2018-10-09 | End: 2018-10-09

## 2018-10-09 RX ADMIN — CEPHALEXIN 500 MG: 500 CAPSULE ORAL at 12:18

## 2018-10-09 RX ADMIN — FLUCONAZOLE 200 MG: 100 TABLET ORAL at 12:18

## 2018-10-09 NOTE — DISCHARGE INSTRUCTIONS
Take acidophilus or ED yogurt today to help prevent yeast infection    Urinary Tract Infection in Women   WHAT YOU NEED TO KNOW:   A urinary tract infection (UTI) is caused by bacteria that get inside your urinary tract  Most bacteria that enter your urinary tract come out when you urinate  If the bacteria stay in your urinary tract, you may get an infection  Your urinary tract includes your kidneys, ureters, bladder, and urethra  Urine is made in your kidneys, and it flows from the ureters to the bladder  Urine leaves the bladder through the urethra  A UTI is more common in your lower urinary tract, which includes your bladder and urethra  DISCHARGE INSTRUCTIONS:   Seek care immediately if:   · You are urinating very little or not at all  · You have a high fever with shaking chills  · You have side or back pain that gets worse  Contact your healthcare provider if:   · You have a fever  · You do not feel better after 2 days of taking antibiotics  · You are vomiting  · You have questions or concerns about your condition or care  Medicines:   · Antibiotics  help fight a bacterial infection  · Medicines  may be given to decrease pain and burning when you urinate  They will also help decrease the feeling that you need to urinate often  These medicines will make your urine orange or red  · Take your medicine as directed  Contact your healthcare provider if you think your medicine is not helping or if you have side effects  Tell him or her if you are allergic to any medicine  Keep a list of the medicines, vitamins, and herbs you take  Include the amounts, and when and why you take them  Bring the list or the pill bottles to follow-up visits  Carry your medicine list with you in case of an emergency  Follow up with your healthcare provider as directed:  Write down your questions so you remember to ask them during your visits  Prevent another UTI:   · Empty your bladder often    Urinate and empty your bladder as soon as you feel the need  Do not hold your urine for long periods of time  · Wipe from front to back after you urinate or have a bowel movement  This will help prevent germs from getting into your urinary tract through your urethra  · Drink liquids as directed  Ask how much liquid to drink each day and which liquids are best for you  You may need to drink more liquids than usual to help flush out the bacteria  Do not drink alcohol, caffeine, or citrus juices  These can irritate your bladder and increase your symptoms  Your healthcare provider may recommend cranberry juice to help prevent a UTI  · Urinate after you have sex  This can help flush out bacteria passed during sex  · Do not douche or use feminine deodorants  These can change the chemical balance in your vagina  · Change sanitary pads or tampons often  This will help prevent germs from getting into your urinary tract  · Do pelvic muscle exercises often  Pelvic muscle exercises may help you start and stop urinating  Strong pelvic muscles may help you empty your bladder easier  Squeeze these muscles tightly for 5 seconds like you are trying to hold back urine  Then relax for 5 seconds  Gradually work up to squeezing for 10 seconds  Do 3 sets of 15 repetitions a day, or as directed  © 2017 [address] [facility] Information is for End User's use only and may not be sold, redistributed or otherwise used for commercial purposes  All illustrations and images included in CareNotes® are the copyrighted property of [employer]  or Robby Chan  The above information is an  only  It is not intended as medical advice for individual conditions or treatments  Talk to your doctor, nurse or pharmacist before following any medical regimen to see if it is safe and effective for you  Vulvovaginal Candidiasis   WHAT YOU NEED TO KNOW:   What is vulvovaginal candidiasis?   Vulvovaginal candidiasis, or yeast infection, is a common vaginal infection  Vulvovaginal candidiasis is caused by a fungus, or yeast-like germ  Fungi are normally found in your vagina  When there are too many fungi, it can cause an infection  What increases my risk for vulvovaginal candidiasis? · Pregnancy    · Medical conditions that suppress your immune system, such as diabetes or HIV and AIDS    · Medicines, such as antibiotics, birth control pills, or steroid medicine    · Contraceptive devices, such as diaphragms, sponges, and intrauterine devices  What are the signs and symptoms of vulvovaginal candidiasis? · Thick, white, cheese-like discharge from your vagina    · Itching, swelling, or redness in your vagina    · Burning when you urinate  How is vulvovaginal candidiasis diagnosed? Your healthcare provider will ask about your medical history and examine you  A sample of your vaginal discharge may show what germ is causing your infection  How is vulvovaginal candidiasis treated? Medicines help treat the fungal infection and decrease inflammation  The medicine may be a pill, topical cream, or vaginal suppository  With treatment, the infection is usually gone within a week: How can I manage my symptoms? · Wear cotton underwear  · Keep the vaginal area clean and dry  · Wipe from front to back after you urinate or have a bowel movement  · Do not have sex until your symptoms are gone  · Do not douche  · Do not use strong perfumes or soaps  · Do not use feminine hygiene sprays, powders, or bubble bath  How can I prevent another infection? · Take showers instead of baths  · Eat yogurt  · Limit the amount of alcohol you drink  · Limit your time in hot tubs  · Control your blood sugar if you are diabetic  When should I seek immediate care? · You have fever and chills  · You are bleeding from your vagina and it is not your monthly period      · You develop abdominal or pelvic pain   When should I contact my healthcare provider? · Your signs and symptoms get worse, even after treatment  · You have questions or concerns about your condition or care  CARE AGREEMENT:   You have the right to help plan your care  Learn about your health condition and how it may be treated  Discuss treatment options with your caregivers to decide what care you want to receive  You always have the right to refuse treatment  The above information is an  only  It is not intended as medical advice for individual conditions or treatments  Talk to your doctor, nurse or pharmacist before following any medical regimen to see if it is safe and effective for you  © 2017 [facility] Information is for End User's use only and may not be sold, redistributed or otherwise used for commercial purposes  All illustrations and images included in CareNotes® are the copyrighted property of A D A M , Inc  or Robby Chan

## 2018-10-09 NOTE — ED PROVIDER NOTES
History  Chief Complaint   Patient presents with    Possible UTI     states that for several days has been having painful urination, itching for several days  Patient is a 53-year-old female coming in today with complaints of dysuria for several days  Patient states on Friday she noticed burning discomfort with frequency as well as urinary frequency  She has no fevers chills or night sweats  She was recently on antibiotics of doxycycline in September which she has taken approximately 1-2 week course  She is attempting to drink cranberry juice with without any relief  Patient has a monogamous relationship approximately 12 years  Denies any vaginal discharge  She does feel like symptoms are consistent with urinary infection as well as yeast infection which she has had in the past         History provided by:  Patient   used: No    Urinary Frequency   Severity:  Moderate  Onset quality:  Gradual  Timing:  Constant  Progression:  Unchanged  Chronicity:  New  Associated symptoms: no abdominal pain, no chest pain, no diarrhea, no ear pain, no fever, no myalgias, no nausea, no rash, no shortness of breath, no sore throat, no vomiting and no wheezing        None       Past Medical History:   Diagnosis Date    Blind left eye     Major depression 2013    Prosthetic eye globe     Pyelonephritis 2014    w/sepsis       Past Surgical History:   Procedure Laterality Date     SECTION      x3    EYE SURGERY         Family History   Problem Relation Age of Onset    Diabetes Family         Mellitus    Diabetes Mother         Mellitus     I have reviewed and agree with the history as documented      Social History   Substance Use Topics    Smoking status: Current Every Day Smoker     Packs/day: 0 25     Years: 4 00     Types: Cigarettes    Smokeless tobacco: Never Used    Alcohol use Yes      Comment: occasional         Review of Systems   Constitutional: Negative for diaphoresis and fever    HENT: Negative for ear pain and sore throat  Eyes: Negative for visual disturbance  Respiratory: Negative for chest tightness, shortness of breath and wheezing  Cardiovascular: Negative for chest pain and palpitations  Gastrointestinal: Negative for abdominal pain, diarrhea, nausea and vomiting  Genitourinary: Positive for frequency  Negative for decreased urine volume, difficulty urinating, dysuria, urgency, vaginal bleeding, vaginal discharge and vaginal pain  Musculoskeletal: Negative for back pain, myalgias and neck pain  Skin: Negative for rash  Neurological: Negative for weakness  Psychiatric/Behavioral: Negative for confusion  All other systems reviewed and are negative  Physical Exam  Physical Exam   Constitutional: She is oriented to person, place, and time  She appears well-developed and well-nourished  No distress  HENT:   Head: Normocephalic and atraumatic  Mouth/Throat: Oropharynx is clear and moist    Eyes: Pupils are equal, round, and reactive to light  Conjunctivae and EOM are normal    Neck: Normal range of motion  Neck supple  Cardiovascular: Normal rate, regular rhythm, normal heart sounds and intact distal pulses  No murmur heard  Pulmonary/Chest: Effort normal and breath sounds normal  No stridor  No respiratory distress  Abdominal: Soft  Bowel sounds are normal  She exhibits no distension  There is no tenderness  Musculoskeletal: Normal range of motion  She exhibits no edema  Neurological: She is alert and oriented to person, place, and time  No cranial nerve deficit  Skin: Skin is warm  Capillary refill takes less than 2 seconds  She is not diaphoretic  Nursing note and vitals reviewed        Vital Signs  ED Triage Vitals [10/09/18 1131]   Temperature Pulse Respirations Blood Pressure SpO2   (!) 96 8 °F (36 °C) 97 18 109/69 100 %      Temp Source Heart Rate Source Patient Position - Orthostatic VS BP Location FiO2 (%)   Tympanic Monitor Sitting Left arm --      Pain Score       --           Vitals:    10/09/18 1131   BP: 109/69   Pulse: 97   Patient Position - Orthostatic VS: Sitting       Visual Acuity      ED Medications  Medications   cephalexin (KEFLEX) capsule 500 mg (500 mg Oral Given 10/9/18 1218)   fluconazole (DIFLUCAN) tablet 200 mg (200 mg Oral Given 10/9/18 1218)       Diagnostic Studies  Results Reviewed     Procedure Component Value Units Date/Time    Urine Microscopic [[id number]]  (Abnormal) Collected:  10/09/18 1149    Lab Status:  Final result Specimen:  Urine from Urine, Clean Catch Updated:  10/09/18 1224     RBC, UA 0-1 (A) /hpf      WBC, UA 1-2 (A) /hpf      Epithelial Cells Innumerable (A) /hpf      Bacteria, UA Occasional /hpf     UA w Reflex to Microscopic w Reflex to Culture [[id number]]  (Abnormal) Collected:  10/09/18 1149    Lab Status:  Final result Specimen:  Urine from Urine, Clean Catch Updated:  10/09/18 1211     Color, UA Yellow     Clarity, UA Slightly Cloudy (A)     Specific Gravity, UA 1 015     pH, UA 6 0     Leukocytes,  0 (A)     Nitrite, UA Negative     Protein, UA Negative mg/dl      Glucose, UA Negative mg/dl      Ketones, UA Negative mg/dl      Bilirubin, UA Negative     Blood, UA 25 0 (A)     UROBILINOGEN UA Negative mg/dL     POCT pregnancy, urine [[id number]]  (Normal) Resulted:  10/09/18 1149    Lab Status:  Final result Updated:  10/09/18 1150     EXT PREG TEST UR (Ref: Negative) neg preg                 No orders to display              Procedures  Procedures       Phone Contacts  ED Phone Contact    ED Course                               MDM  Number of Diagnoses or Management Options  UTI (urinary tract infection):   Yeast infection:   Diagnosis management comments: Patient is a 57-year-old female coming in today with urinary frequency and dysuria  On exam patient is nontoxic appearing  She states she feels like she has a yeast infection  Will check urine      12:15 PM  Patient is asking to leave   Given recent antibiotic use as well as whitish discharge when she states I put my fingers inside" will give dose of Diflucan  Will also give Keflex for 3 days  Portions of the record may have been created with voice recognition software  Occasional wrong word or "sound a like" substitutions may have occurred due to the inherent limitations of voice recognition software  Read the chart carefully and recognize, using context, where substitutions have occurred  Amount and/or Complexity of Data Reviewed  Clinical lab tests: reviewed and ordered  Tests in the medicine section of CPT®: reviewed and ordered      CritCare Time    Disposition  Final diagnoses:   UTI (urinary tract infection)   Yeast infection     Time reflects when diagnosis was documented in both MDM as applicable and the Disposition within this note     Time User Action Codes Description Comment    10/9/2018 12:14 PM Mariam Fam Add [N39 0] UTI (urinary tract infection)     10/9/2018 12:14 PM Nathaly Fam Add [B37 9] Yeast infection       ED Disposition     ED Disposition Condition Comment    Discharge    [address] discharge to home/self care  Condition at discharge: Stable        Follow-up Information     Follow up With Specialties Details Why [address] Family Medicine Schedule an appointment as soon as possible for a visit in 2 days  [address] [place] [address] Drive [address] [phone]            Discharge Medication List as of 10/9/2018 12:15 PM      START taking these medications    Details   cephalexin (KEFLEX) 500 mg capsule Take 1 capsule (500 mg total) by mouth every 8 (eight) hours for 3 days, Starting Tue 10/9/2018, Until Fri 10/12/2018, Print           No discharge procedures on file      ED Provider  Electronically Signed by           Jared Newman DO  10/09/18 7780

## 2019-05-11 ENCOUNTER — HOSPITAL ENCOUNTER (EMERGENCY)
Facility: HOSPITAL | Age: 39
Discharge: HOME | End: 2019-05-11
Attending: EMERGENCY MEDICINE
Payer: MEDICARE

## 2019-05-11 VITALS — RESPIRATION RATE: 20 BRPM | TEMPERATURE: 97.3 F | HEIGHT: 64 IN | BODY MASS INDEX: 30.11 KG/M2 | WEIGHT: 176.37 LBS

## 2019-05-11 DIAGNOSIS — T65.893A TOXIC EFFECT OF PEPPER SPRAY, ASSAULT, INITIAL ENCOUNTER: Primary | ICD-10-CM

## 2019-05-11 PROCEDURE — 99284 EMERGENCY DEPT VISIT MOD MDM: CPT

## 2019-05-11 PROCEDURE — 99282 EMERGENCY DEPT VISIT SF MDM: CPT | Performed by: EMERGENCY MEDICINE

## 2019-05-13 ENCOUNTER — HOSPITAL ENCOUNTER (EMERGENCY)
Facility: HOSPITAL | Age: 39
Discharge: HOME | End: 2019-05-13
Attending: EMERGENCY MEDICINE | Admitting: EMERGENCY MEDICINE
Payer: MEDICARE

## 2019-05-13 VITALS
SYSTOLIC BLOOD PRESSURE: 130 MMHG | TEMPERATURE: 95.9 F | HEART RATE: 93 BPM | RESPIRATION RATE: 16 BRPM | OXYGEN SATURATION: 97 % | BODY MASS INDEX: 30.12 KG/M2 | DIASTOLIC BLOOD PRESSURE: 83 MMHG | WEIGHT: 175.5 LBS

## 2019-05-13 DIAGNOSIS — L25.9 CONTACT DERMATITIS: Primary | ICD-10-CM

## 2019-05-13 PROCEDURE — 99283 EMERGENCY DEPT VISIT LOW MDM: CPT | Performed by: PHYSICIAN ASSISTANT

## 2019-05-13 PROCEDURE — 99282 EMERGENCY DEPT VISIT SF MDM: CPT

## 2019-05-13 RX ADMIN — DEXAMETHASONE SODIUM PHOSPHATE 10 MG: 10 INJECTION, SOLUTION INTRAMUSCULAR; INTRAVENOUS at 19:21

## 2019-06-24 ENCOUNTER — HOSPITAL ENCOUNTER (EMERGENCY)
Facility: HOSPITAL | Age: 39
Discharge: HOME | End: 2019-06-24
Attending: EMERGENCY MEDICINE
Payer: MEDICARE

## 2019-06-24 VITALS
TEMPERATURE: 97.6 F | BODY MASS INDEX: 29.87 KG/M2 | SYSTOLIC BLOOD PRESSURE: 125 MMHG | HEART RATE: 107 BPM | RESPIRATION RATE: 16 BRPM | DIASTOLIC BLOOD PRESSURE: 76 MMHG | OXYGEN SATURATION: 95 % | WEIGHT: 174 LBS

## 2019-06-24 DIAGNOSIS — T78.40XA ALLERGIC REACTION, INITIAL ENCOUNTER: Primary | ICD-10-CM

## 2019-06-24 DIAGNOSIS — L50.9 URTICARIA: ICD-10-CM

## 2019-06-24 PROCEDURE — 99283 EMERGENCY DEPT VISIT LOW MDM: CPT

## 2019-06-24 PROCEDURE — 99284 EMERGENCY DEPT VISIT MOD MDM: CPT | Performed by: PHYSICIAN ASSISTANT

## 2019-06-24 PROCEDURE — 96361 HYDRATE IV INFUSION ADD-ON: CPT

## 2019-06-24 PROCEDURE — 96374 THER/PROPH/DIAG INJ IV PUSH: CPT

## 2019-06-24 PROCEDURE — 96375 TX/PRO/DX INJ NEW DRUG ADDON: CPT

## 2019-06-24 RX ORDER — PREDNISONE 20 MG/1
20 TABLET ORAL 3 TIMES DAILY
Qty: 12 TABLET | Refills: 0 | Status: SHIPPED | OUTPATIENT
Start: 2019-06-24 | End: 2019-06-28

## 2019-06-24 RX ORDER — METHYLPREDNISOLONE SODIUM SUCCINATE 125 MG/2ML
125 INJECTION, POWDER, LYOPHILIZED, FOR SOLUTION INTRAMUSCULAR; INTRAVENOUS ONCE
Status: COMPLETED | OUTPATIENT
Start: 2019-06-24 | End: 2019-06-24

## 2019-06-24 RX ORDER — PREDNISONE 50 MG/1
100 TABLET ORAL DAILY
COMMUNITY

## 2019-06-24 RX ORDER — FAMOTIDINE 20 MG/1
20 TABLET, FILM COATED ORAL 2 TIMES DAILY
Qty: 30 TABLET | Refills: 0 | Status: SHIPPED | OUTPATIENT
Start: 2019-06-24

## 2019-06-24 RX ADMIN — FAMOTIDINE 40 MG: 10 INJECTION, SOLUTION INTRAVENOUS at 15:50

## 2019-06-24 RX ADMIN — SODIUM CHLORIDE 1000 ML: 9 INJECTION, SOLUTION INTRAVENOUS at 15:48

## 2019-06-24 RX ADMIN — METHYLPREDNISOLONE SODIUM SUCCINATE 125 MG: 125 INJECTION, POWDER, FOR SOLUTION INTRAMUSCULAR; INTRAVENOUS at 15:50

## 2019-07-02 ENCOUNTER — APPOINTMENT (EMERGENCY)
Dept: RADIOLOGY | Facility: HOSPITAL | Age: 39
End: 2019-07-02
Payer: MEDICARE

## 2019-07-02 ENCOUNTER — HOSPITAL ENCOUNTER (EMERGENCY)
Facility: HOSPITAL | Age: 39
Discharge: HOME | End: 2019-07-02
Attending: EMERGENCY MEDICINE | Admitting: EMERGENCY MEDICINE
Payer: MEDICARE

## 2019-07-02 ENCOUNTER — APPOINTMENT (EMERGENCY)
Dept: CT IMAGING | Facility: HOSPITAL | Age: 39
End: 2019-07-02
Payer: MEDICARE

## 2019-07-02 VITALS
TEMPERATURE: 98.4 F | BODY MASS INDEX: 29.52 KG/M2 | HEART RATE: 90 BPM | OXYGEN SATURATION: 100 % | WEIGHT: 171.96 LBS | DIASTOLIC BLOOD PRESSURE: 80 MMHG | RESPIRATION RATE: 16 BRPM | SYSTOLIC BLOOD PRESSURE: 120 MMHG

## 2019-07-02 DIAGNOSIS — S80.00XA KNEE CONTUSION: ICD-10-CM
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
## 2019-07-02 DIAGNOSIS — T07.XXXA ABRASIONS OF MULTIPLE SITES: ICD-10-CM

## 2019-07-02 DIAGNOSIS — S09.90XA INJURY OF HEAD, INITIAL ENCOUNTER: ICD-10-CM

## 2019-07-02 DIAGNOSIS — M25.562 LEFT KNEE PAIN: ICD-10-CM

## 2019-07-02 PROCEDURE — 99284 EMERGENCY DEPT VISIT MOD MDM: CPT | Performed by: EMERGENCY MEDICINE

## 2019-07-02 PROCEDURE — 73564 X-RAY EXAM KNEE 4 OR MORE: CPT

## 2019-07-02 PROCEDURE — 99284 EMERGENCY DEPT VISIT MOD MDM: CPT

## 2019-07-02 PROCEDURE — 70450 CT HEAD/BRAIN W/O DYE: CPT

## 2019-07-02 RX ORDER — ACETAMINOPHEN 325 MG/1
975 TABLET ORAL ONCE
Status: COMPLETED | OUTPATIENT
Start: 2019-07-02 | End: 2019-07-02

## 2019-07-02 RX ORDER — HYDROXYZINE HYDROCHLORIDE 25 MG/1
25 TABLET, FILM COATED ORAL ONCE
Status: COMPLETED | OUTPATIENT
Start: 2019-07-02 | End: 2019-07-02

## 2019-07-02 RX ORDER — ONDANSETRON 4 MG/1
4 TABLET, ORALLY DISINTEGRATING ORAL ONCE
Status: COMPLETED | OUTPATIENT
Start: 2019-07-02 | End: 2019-07-02

## 2019-07-02 RX ORDER — DICLOFENAC SODIUM 25 MG/1
50 TABLET, DELAYED RELEASE ORAL 2 TIMES DAILY
Qty: 20 TABLET | Refills: 0 | Status: SHIPPED | OUTPATIENT
Start: 2019-07-02 | End: 2021-08-06 | Stop reason: ALTCHOICE

## 2019-07-02 RX ADMIN — HYDROXYZINE HYDROCHLORIDE 25 MG: 25 TABLET ORAL at 20:22

## 2019-07-02 RX ADMIN — ONDANSETRON 4 MG: 4 TABLET, ORALLY DISINTEGRATING ORAL at 18:55

## 2019-07-02 RX ADMIN — ACETAMINOPHEN 975 MG: 325 TABLET ORAL at 18:55
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
## 2019-07-03 NOTE — ED NOTES
Pt refusing to utilize both crutches at this time  Provided both to take home; ambulated from department with one crutch and knee immobilizer in place        Delonte Angeles  30/45/54 2051

## 2020-01-29 ENCOUNTER — HOSPITAL ENCOUNTER (EMERGENCY)
Facility: HOSPITAL | Age: 40
Discharge: HOME | End: 2020-01-29
Attending: EMERGENCY MEDICINE | Admitting: EMERGENCY MEDICINE
Payer: MEDICARE

## 2020-01-29 VITALS
RESPIRATION RATE: 16 BRPM | SYSTOLIC BLOOD PRESSURE: 131 MMHG | TEMPERATURE: 98.5 F | WEIGHT: 173.72 LBS | HEART RATE: 108 BPM | OXYGEN SATURATION: 100 % | DIASTOLIC BLOOD PRESSURE: 83 MMHG | BODY MASS INDEX: 29.82 KG/M2

## 2020-01-29 DIAGNOSIS — Z20.2 POSSIBLE EXPOSURE TO STD: ICD-10-CM

## 2020-01-29 DIAGNOSIS — N76.0 BACTERIAL VAGINOSIS: Primary | ICD-10-CM

## 2020-01-29 DIAGNOSIS — B96.89 BACTERIAL VAGINOSIS: Primary | ICD-10-CM

## 2020-01-29 LAB
AMORPH URATE CRY URNS QL MICRO: ABNORMAL /HPF
BACTERIA UR QL AUTO: ABNORMAL /HPF
BILIRUB UR QL STRIP: NEGATIVE
CLARITY UR: CLEAR
COLOR UR: YELLOW
EXT PREG TEST URINE: NEGATIVE
EXT. CONTROL ED NAV: NORMAL
GLUCOSE UR STRIP-MCNC: NEGATIVE MG/DL
HGB UR QL STRIP.AUTO: ABNORMAL
KETONES UR STRIP-MCNC: NEGATIVE MG/DL
LEUKOCYTE ESTERASE UR QL STRIP: NEGATIVE
NITRITE UR QL STRIP: NEGATIVE
NON-SQ EPI CELLS URNS QL MICRO: ABNORMAL /HPF
PH UR STRIP.AUTO: 7 [PH] (ref 4.5–8)
PROT UR STRIP-MCNC: NEGATIVE MG/DL
RBC #/AREA URNS AUTO: ABNORMAL /HPF
SP GR UR STRIP.AUTO: 1.02 (ref 1–1.03)
UROBILINOGEN UR QL STRIP.AUTO: 0.2 E.U./DL
WBC #/AREA URNS AUTO: ABNORMAL /HPF

## 2020-01-29 PROCEDURE — 99283 EMERGENCY DEPT VISIT LOW MDM: CPT

## 2020-01-29 PROCEDURE — 81025 URINE PREGNANCY TEST: CPT | Performed by: PHYSICIAN ASSISTANT

## 2020-01-29 PROCEDURE — 81001 URINALYSIS AUTO W/SCOPE: CPT

## 2020-01-29 PROCEDURE — 87491 CHLMYD TRACH DNA AMP PROBE: CPT | Performed by: PHYSICIAN ASSISTANT

## 2020-01-29 PROCEDURE — 96372 THER/PROPH/DIAG INJ SC/IM: CPT

## 2020-01-29 PROCEDURE — 99284 EMERGENCY DEPT VISIT MOD MDM: CPT | Performed by: PHYSICIAN ASSISTANT

## 2020-01-29 PROCEDURE — 87591 N.GONORRHOEAE DNA AMP PROB: CPT | Performed by: PHYSICIAN ASSISTANT

## 2020-01-29 RX ORDER — AZITHROMYCIN 250 MG/1
1000 TABLET, FILM COATED ORAL ONCE
Status: COMPLETED | OUTPATIENT
Start: 2020-01-29 | End: 2020-01-29

## 2020-01-29 RX ORDER — METRONIDAZOLE 500 MG/1
500 TABLET ORAL EVERY 12 HOURS SCHEDULED
Qty: 14 TABLET | Refills: 0 | Status: SHIPPED | OUTPATIENT
Start: 2020-01-29 | End: 2020-02-05

## 2020-01-29 RX ADMIN — AZITHROMYCIN 1000 MG: 250 TABLET, FILM COATED ORAL at 16:15

## 2020-01-29 RX ADMIN — LIDOCAINE HYDROCHLORIDE 250 MG: 10 INJECTION, SOLUTION EPIDURAL; INFILTRATION; INTRACAUDAL; PERINEURAL at 16:12

## 2020-01-29 NOTE — ED PROVIDER NOTES
History  Chief Complaint   Patient presents with    Vaginal Discharge     Vaginal discharge x1 week "watery, clear, gooey", "fishy" odor since yesterday, discomfort  69-year-old female presents emergency department complaining watery clear UE any fishy smelling discharge from her vagina for the last 2 days  She states that she had sex approximately 5 weeks ago with her long-term boyfriend  She appears very anxious due to the symptoms  She expresses concern for possible STD exposure  She reports having bacterial vaginosis in past states that her symptoms are identical   She denies any urinary complaints as well as fever chills nausea vomiting abdominal pain chest pain shortness of breath  Allergies reviewed          Prior to Admission Medications   Prescriptions Last Dose Informant Patient Reported? Taking? Apremilast (OTEZLA PO)   Yes No   Sig: Take 40 mg by mouth 2 (two) times a day   diclofenac (VOLTAREN) 25 MG EC tablet   No No   Sig: Take 2 tablets (50 mg total) by mouth 2 (two) times a day   famotidine (PEPCID) 20 mg tablet   No No   Sig: Take 1 tablet (20 mg total) by mouth 2 (two) times a day   Patient not taking: Reported on 2019   predniSONE 50 mg tablet  Self Yes No   Sig: Take 100 mg by mouth daily      Facility-Administered Medications: None       Past Medical History:   Diagnosis Date    Blind left eye     Major depression 2013    Prosthetic eye globe     Pyelonephritis 2014    w/sepsis       Past Surgical History:   Procedure Laterality Date     SECTION      x3    EYE SURGERY         Family History   Problem Relation Age of Onset    Diabetes Family         Mellitus    Diabetes Mother         Mellitus     I have reviewed and agree with the history as documented      Social History     Tobacco Use    Smoking status: Current Every Day Smoker     Packs/day: 0 25     Years: 4 00     Pack years: 1 00     Types: Cigarettes    Smokeless tobacco: Never Used   Substance Use Topics    Alcohol use: Yes     Comment: occasional     Drug use: No        Review of Systems   Genitourinary: Positive for vaginal discharge  All other systems reviewed and are negative  Physical Exam  Physical Exam   Constitutional: She is oriented to person, place, and time  She appears well-developed and well-nourished  No distress  HENT:   Head: Normocephalic and atraumatic  Right Ear: External ear normal    Left Ear: External ear normal    Nose: Nose normal    Mouth/Throat: Oropharynx is clear and moist    Eyes: Pupils are equal, round, and reactive to light  Conjunctivae and EOM are normal    Neck: Normal range of motion  Cardiovascular: Normal rate, regular rhythm, normal heart sounds and intact distal pulses  Exam reveals no gallop and no friction rub  No murmur heard  Pulmonary/Chest: Effort normal and breath sounds normal  No stridor  No respiratory distress  She has no wheezes  She has no rales  Abdominal: Soft  Bowel sounds are normal  She exhibits no distension  There is no tenderness  There is no guarding  Genitourinary:   Genitourinary Comments: Exam deferred  Musculoskeletal: Normal range of motion  She exhibits no tenderness  Neurological: She is alert and oriented to person, place, and time  Skin: Skin is warm  Capillary refill takes less than 2 seconds  She is not diaphoretic  Nursing note and vitals reviewed        Vital Signs  ED Triage Vitals [01/29/20 1410]   Temperature Pulse Respirations Blood Pressure SpO2   98 5 °F (36 9 °C) (!) 108 16 131/83 100 %      Temp Source Heart Rate Source Patient Position - Orthostatic VS BP Location FiO2 (%)   Oral Monitor Sitting Right arm --      Pain Score       No Pain           Vitals:    01/29/20 1410   BP: 131/83   Pulse: (!) 108   Patient Position - Orthostatic VS: Sitting         Visual Acuity      ED Medications  Medications   cefTRIAXone (ROCEPHIN) 250 mg in lidocaine (PF) (XYLOCAINE-MPF) 1 % IM only syringe (250 mg Intramuscular Given 1/29/20 1612)   azithromycin (ZITHROMAX) tablet 1,000 mg (1,000 mg Oral Given 1/29/20 1615)       Diagnostic Studies  Results Reviewed     Procedure Component Value Units Date/Time    Urine Microscopic [[id number]]  (Abnormal) Collected:  01/29/20 1605    Lab Status:  Final result Specimen:  Urine, Clean Catch Updated:  01/29/20 1722     RBC, UA 1-2 /hpf      WBC, UA 0-1 /hpf      Epithelial Cells Occasional /hpf      Bacteria, UA Moderate /hpf      AMORPH URATES Moderate /hpf     Chlamydia/GC amplified DNA by PCR [[id number]] Collected:  01/29/20 1607    Lab Status: In process Specimen:  Urine, Other Updated:  01/29/20 1609    POCT pregnancy, urine [[id number]]  (Normal) Resulted:  01/29/20 1607    Lab Status:  Final result Updated:  01/29/20 1607     EXT PREG TEST UR (Ref: Negative) negative     Control valid    Urine Macroscopic, POC [[id number]]  (Abnormal) Collected:  01/29/20 1605    Lab Status:  Final result Specimen:  Urine Updated:  01/29/20 1606     Color, UA Yellow     Clarity, UA Clear     pH, UA 7 0     Leukocytes, UA Negative     Nitrite, UA Negative     Protein, UA Negative mg/dl      Glucose, UA Negative mg/dl      Ketones, UA Negative mg/dl      Urobilinogen, UA 0 2 E U /dl      Bilirubin, UA Negative     Blood, UA Trace     Specific [place], UA 1 020    Narrative:       CLINITEK RESULT                 No orders to display              Procedures  Procedures         ED Course                               MDM  Number of Diagnoses or Management Options  Diagnosis management comments: Will treat prophylactically for STD exposure  Will check urine for STDs  Will treat for bacterial vaginosis  Patient educated regarding their diagnosis and given return and follow-up instructions  Patient is understanding and in agreement with the treatment plan  There are no questions at the time of discharge         Amount and/or Complexity of Data Reviewed  Clinical lab tests: ordered    Risk of Complications, Morbidity, and/or Mortality  Presenting problems: low  Diagnostic procedures: low  Management options: low    Patient Progress  Patient progress: stable        Disposition  Final diagnoses:   Bacterial vaginosis   Possible exposure to STD     Time reflects when diagnosis was documented in both MDM as applicable and the Disposition within this note     Time User Action Codes Description Comment    1/29/2020  4:08 PM Bright Welch Add [N76 0,  B96 89] Bacterial vaginosis     1/29/2020  4:08 PM Bright Welch Add [Z20 2] Possible exposure to STD       ED Disposition     ED Disposition Condition Date/Time Comment    Discharge Stable Wed Jan 29, 2020  4:08 PM [place] discharge to home/self care  Follow-up Information    None         Discharge Medication List as of 1/29/2020  4:11 PM      START taking these medications    Details   metroNIDAZOLE (FLAGYL) 500 mg tablet Take 1 tablet (500 mg total) by mouth every 12 (twelve) hours for 7 days, Starting Wed 1/29/2020, Until Wed 2/5/2020, Normal         CONTINUE these medications which have NOT CHANGED    Details   Apremilast (OTEZLA PO) Take 40 mg by mouth 2 (two) times a day, Historical Med      diclofenac (VOLTAREN) 25 MG EC tablet Take 2 tablets (50 mg total) by mouth 2 (two) times a day, Starting Tue 7/2/2019, Print      famotidine (PEPCID) 20 mg tablet Take 1 tablet (20 mg total) by mouth 2 (two) times a day, Starting Mon 6/24/2019, Print      predniSONE 50 mg tablet Take 100 mg by mouth daily, Historical Med           No discharge procedures on file      ED Provider  Electronically Signed by           John Paul Morton PA-C  01/29/20 3190

## 2020-01-30 LAB
C TRACH DNA SPEC QL NAA+PROBE: NEGATIVE
N GONORRHOEA DNA SPEC QL NAA+PROBE: NEGATIVE

## 2020-10-12 ENCOUNTER — HOSPITAL ENCOUNTER (EMERGENCY)
Facility: HOSPITAL | Age: 40
Discharge: HOME | End: 2020-10-12
Attending: EMERGENCY MEDICINE | Admitting: EMERGENCY MEDICINE
Payer: MEDICARE

## 2020-10-12 VITALS
WEIGHT: 189.6 LBS | OXYGEN SATURATION: 99 % | TEMPERATURE: 98 F | BODY MASS INDEX: 32.54 KG/M2 | SYSTOLIC BLOOD PRESSURE: 148 MMHG | HEART RATE: 88 BPM | DIASTOLIC BLOOD PRESSURE: 88 MMHG | RESPIRATION RATE: 18 BRPM

## 2020-10-12 DIAGNOSIS — M62.830 LUMBAR PARASPINAL MUSCLE SPASM: Primary | ICD-10-CM

## 2020-10-12 PROCEDURE — 99283 EMERGENCY DEPT VISIT LOW MDM: CPT

## 2020-10-12 PROCEDURE — 99284 EMERGENCY DEPT VISIT MOD MDM: CPT | Performed by: PHYSICIAN ASSISTANT

## 2020-10-12 PROCEDURE — 96372 THER/PROPH/DIAG INJ SC/IM: CPT

## 2020-10-12 RX ORDER — KETOROLAC TROMETHAMINE 30 MG/ML
15 INJECTION, SOLUTION INTRAMUSCULAR; INTRAVENOUS ONCE
Status: COMPLETED | OUTPATIENT
Start: 2020-10-12 | End: 2020-10-12

## 2020-10-12 RX ORDER — DIAZEPAM 5 MG/ML
5 INJECTION, SOLUTION INTRAMUSCULAR; INTRAVENOUS ONCE
Status: COMPLETED | OUTPATIENT
Start: 2020-10-12 | End: 2020-10-12

## 2020-10-12 RX ORDER — NAPROXEN 500 MG/1
500 TABLET ORAL 2 TIMES DAILY WITH MEALS
Qty: 10 TABLET | Refills: 0 | Status: SHIPPED | OUTPATIENT
Start: 2020-10-12 | End: 2020-10-12 | Stop reason: SDUPTHER

## 2020-10-12 RX ORDER — CYCLOBENZAPRINE HCL 5 MG
5 TABLET ORAL 3 TIMES DAILY PRN
Qty: 5 TABLET | Refills: 0 | OUTPATIENT
Start: 2020-10-12

## 2020-10-12 RX ORDER — NAPROXEN 500 MG/1
500 TABLET ORAL 2 TIMES DAILY WITH MEALS
Qty: 10 TABLET | Refills: 0 | OUTPATIENT
Start: 2020-10-12 | End: 2021-08-06 | Stop reason: SDDI

## 2020-10-12 RX ORDER — CYCLOBENZAPRINE HCL 5 MG
5 TABLET ORAL 3 TIMES DAILY PRN
Qty: 5 TABLET | Refills: 0 | Status: SHIPPED | OUTPATIENT
Start: 2020-10-12 | End: 2020-10-12 | Stop reason: SDUPTHER

## 2020-10-12 RX ADMIN — KETOROLAC TROMETHAMINE 15 MG: 30 INJECTION, SOLUTION INTRAMUSCULAR at 13:25

## 2020-10-12 RX ADMIN — DIAZEPAM 5 MG: 10 INJECTION, SOLUTION INTRAMUSCULAR; INTRAVENOUS at 13:27

## 2020-10-16 ENCOUNTER — HOSPITAL ENCOUNTER (EMERGENCY)
Facility: HOSPITAL | Age: 40
Discharge: HOME | End: 2020-10-16
Attending: EMERGENCY MEDICINE
Payer: MEDICARE

## 2020-10-16 ENCOUNTER — APPOINTMENT (EMERGENCY)
Dept: RADIOLOGY | Facility: HOSPITAL | Age: 40
End: 2020-10-16
Payer: MEDICARE

## 2020-10-16 VITALS
SYSTOLIC BLOOD PRESSURE: 148 MMHG | WEIGHT: 189.38 LBS | HEART RATE: 87 BPM | DIASTOLIC BLOOD PRESSURE: 91 MMHG | OXYGEN SATURATION: 99 % | TEMPERATURE: 98.4 F | RESPIRATION RATE: 18 BRPM | BODY MASS INDEX: 32.51 KG/M2

## 2020-10-16 DIAGNOSIS — M54.50 LOW BACK PAIN: Primary | ICD-10-CM

## 2020-10-16 PROCEDURE — 72100 X-RAY EXAM L-S SPINE 2/3 VWS: CPT

## 2020-10-16 PROCEDURE — 99284 EMERGENCY DEPT VISIT MOD MDM: CPT | Performed by: PHYSICIAN ASSISTANT

## 2020-10-16 PROCEDURE — 99283 EMERGENCY DEPT VISIT LOW MDM: CPT

## 2020-10-16 RX ORDER — METHOCARBAMOL 750 MG/1
750 TABLET, FILM COATED ORAL EVERY 6 HOURS PRN
Qty: 20 TABLET | Refills: 0 | Status: SHIPPED | OUTPATIENT
Start: 2020-10-16

## 2020-10-19 ENCOUNTER — TELEPHONE (OUTPATIENT)
Dept: PHYSICAL THERAPY | Facility: OTHER | Age: 40
End: 2020-10-19

## 2020-10-21 ENCOUNTER — TELEPHONE (OUTPATIENT)
Dept: PHYSICAL THERAPY | Facility: OTHER | Age: 40
End: 2020-10-21

## 2021-06-28 ENCOUNTER — NURSE TRIAGE (OUTPATIENT)
Dept: OTHER | Facility: OTHER | Age: 41
End: 2021-06-28

## 2021-06-28 DIAGNOSIS — Z20.822 EXPOSURE TO COVID-19 VIRUS: Primary | ICD-10-CM

## 2021-06-28 PROCEDURE — U0003 INFECTIOUS AGENT DETECTION BY NUCLEIC ACID (DNA OR RNA); SEVERE ACUTE RESPIRATORY SYNDROME CORONAVIRUS 2 (SARS-COV-2) (CORONAVIRUS DISEASE [COVID-19]), AMPLIFIED PROBE TECHNIQUE, MAKING USE OF HIGH THROUGHPUT TECHNOLOGIES AS DESCRIBED BY CMS-2020-01-R: HCPCS | Performed by: FAMILY MEDICINE

## 2021-06-28 PROCEDURE — U0005 INFEC AGEN DETEC AMPLI PROBE: HCPCS | Performed by: FAMILY MEDICINE

## 2021-06-28 NOTE — TELEPHONE ENCOUNTER
Patient is requesting a covid test and does not have a [facility]'s PCP  Reports having an out of network PCP  Order placed  Patient informed of closest testing site and was advised of hours of operation, address, to wear a mask, and to stay in the car  Patient verbalized understanding  Link sent to patients email address to create a [facility] account to check for results  Reason for Disposition   [1] COVID-19 infection suspected by caller or triager AND [2] mild symptoms (cough, fever, or others) AND [6] no complications or SOB    Answer Assessment - Initial Assessment Questions  Were you within 6 feet or less, for up to 15 minutes or more with a person that has a confirmed COVID-19 test? Unknown     What was the date of your exposure? Unknown    Are you experiencing any symptoms attributed to the virus?  (Assess for SOB, cough, fever, difficulty breathing) Cough, headache     HIGH RISK: Do you have any history heart or lung conditions, weakened immune system, diabetes, Asthma, CHF, HIV, COPD, Chemo, renal failure, sickle cell, etc? Denies     PREGNANCY: Are you pregnant or did you recently give birth?  Denies    Protocols used: CORONAVIRUS (COVID-19) DIAGNOSED OR SUSPECTED-ADULT-

## 2021-06-29 ENCOUNTER — TELEMEDICINE (OUTPATIENT)
Dept: FAMILY MEDICINE CLINIC | Facility: CLINIC | Age: 41
End: 2021-06-29

## 2021-06-29 ENCOUNTER — TELEPHONE (OUTPATIENT)
Dept: FAMILY MEDICINE CLINIC | Facility: CLINIC | Age: 41
End: 2021-06-29

## 2021-06-29 DIAGNOSIS — J30.2 SEASONAL ALLERGIES: Primary | ICD-10-CM

## 2021-06-29 DIAGNOSIS — Z78.9 UNKNOWN STATUS OF IMMUNITY TO COVID-19 VIRUS: ICD-10-CM

## 2021-06-29 PROCEDURE — G2025 DIS SITE TELE SVCS RHC/FQHC: HCPCS | Performed by: FAMILY MEDICINE

## 2021-06-29 RX ORDER — LORATADINE 10 MG/1
10 TABLET ORAL DAILY
Qty: 30 TABLET | Refills: 0 | Status: SHIPPED | OUTPATIENT
Start: 2021-06-29

## 2021-06-29 NOTE — TELEPHONE ENCOUNTER
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

## 2021-06-29 NOTE — PROGRESS NOTES
COVID-19 Virtual Follow Up Visit       Assessment/Plan:    Problem List Items Addressed This Visit     None      Visit Diagnoses     Seasonal allergies    -  Primary    Relevant Medications    loratadine (CLARITIN) 10 mg tablet    Unknown status of immunity to COVID-19 virus               Disposition:       Awaiting COVID-19 test that was completed 6/28/21  Patient will have completed the recommended 10 days of quarantine, tomorrow  If test is negative patient is cleared to go back to work  If test is positive, she will complete a 14 day quarantine and then return to work  Work note was filled out and left for the  to be picked up by family member or her depending on the outcome of the COVID-19 test  Office will call for results  Likely symptoms related to seasonal allegies/URI  Claritin 10 mg to be taken qd for symptoms  I recommended continued isolation until at least 24 hours have passed since recovery defined as resolution of fever without the use of fever-reducing medications and improvement in respiratory symptoms AND 10 days have passed since onset of symptoms  I spent 30 minutes directly with the patient during this visit    This virtual check-in was done via [employer] and patient was informed that this is a secure, HIPAA-compliant platform  She agrees to proceed     Encounter provider Kassandra Delvalle MD    Provider located at [address] [address] [phone]    Recent Visits  No visits were found meeting these conditions  Showing recent visits within past 7 days and meeting all other requirements  Today's Visits  Date Type Provider Dept   06/29/21 Telephone TEOFILO Mcgowan   06/29/21 MD Maggi Alberts   Showing today's visits and meeting all other requirements  Future Appointments  No visits were found meeting these conditions    Showing future appointments within next 150 days and meeting all other requirements       Patient agrees to participate in a virtual check in via telephone or video visit instead of presenting to the office to address urgent/immediate medical needs  Patient is aware this is a billable service  After connecting through telephone, the patient was identified by name and date of birth  Chelle Payne was informed that this was a telemedicine visit and that the exam was being conducted confidentially over secure lines  My office door was closed  No one else was in the room  Chelle Zimmermanell acknowledged consent and understanding of privacy and security of the telemedicine visit  I informed the patient that I have reviewed her record in Epic and presented the opportunity for her to ask any questions regarding the visit today  The patient agreed to participate  Jerry Meyers is a 42 y o  female who has been screened for COVID-19 yesterday 6/28/21  Patient was in [place] for vacation and came back and experienced some headaches, dizziness, throat discomfort  She denies any fevers, chills, shortness of breath  She has been self-quarantining since 6/20/21  Of note, patients daughter and son have both been sick with really bad coughs but have not been tested  Patient has yet to receive COVID vaccination  [place] reports that she has improved  She reports COVID-19 SYMPTOMS: cough, headache and sore throat  She has not experienced cough, shortness of breath and headache Dolly has been staying home and has isolated themselves in her home  She is taking care to not share personal items and is cleaning all surfaces that are touched often, like counters, tabletops, and doorknobs using household cleaning sprays or wipes  She is wearing a mask when she leaves her room      Past Medical History:   Diagnosis Date    Blind left eye     Major depression 2013    Prosthetic eye globe     Pyelonephritis 2014 w/sepsis       Past Surgical History:   Procedure Laterality Date     SECTION      x3    EYE SURGERY         Current Outpatient Medications   Medication Sig Dispense Refill    Apremilast (OTEZLA PO) Take 40 mg by mouth 2 (two) times a day      cyclobenzaprine (FLEXERIL) 5 mg tablet Take 1 tablet (5 mg total) by mouth 3 (three) times a day as needed for muscle spasms 5 tablet 0    diclofenac (VOLTAREN) 25 MG EC tablet Take 2 tablets (50 mg total) by mouth 2 (two) times a day 20 tablet 0    famotidine (PEPCID) 20 mg tablet Take 1 tablet (20 mg total) by mouth 2 (two) times a day (Patient not taking: Reported on 2019) 30 tablet 0    loratadine (CLARITIN) 10 mg tablet Take 1 tablet (10 mg total) by mouth daily 30 tablet 0    methocarbamol (ROBAXIN) 750 mg tablet Take 1 tablet (750 mg total) by mouth every 6 (six) hours as needed for muscle spasms 20 tablet 0    naproxen (NAPROSYN) 500 mg tablet Take 1 tablet (500 mg total) by mouth 2 (two) times a day with meals for 5 days 10 tablet 0    predniSONE 50 mg tablet Take 100 mg by mouth daily       No current facility-administered medications for this visit  Allergies   Allergen Reactions    Morphine Sulfate Shortness Of Breath    Fentanyl     Morphine And Related        Review of Systems     Video Exam    There were no vitals filed for this visit  Dolly appears no distress  Physical Exam  Unable to be performed as this was vitual visit  VIRTUAL VISIT [address] acknowledges that she has consented to an online visit or consultation  She understands that the online visit is based solely on information provided by her, and that, in the absence of a face-to-face physical evaluation by the physician, the diagnosis she receives is both limited and provisional in terms of accuracy and completeness  This is not intended to replace a full medical face-to-face evaluation by the physician   Ana Chan understands and accepts these terms

## 2021-07-16 ENCOUNTER — APPOINTMENT (EMERGENCY)
Dept: RADIOLOGY | Facility: HOSPITAL | Age: 41
End: 2021-07-16
Payer: MEDICARE

## 2021-07-16 ENCOUNTER — HOSPITAL ENCOUNTER (EMERGENCY)
Facility: HOSPITAL | Age: 41
Discharge: HOME | End: 2021-07-16
Attending: EMERGENCY MEDICINE | Admitting: EMERGENCY MEDICINE
Payer: MEDICARE

## 2021-07-16 VITALS
WEIGHT: 185.85 LBS | SYSTOLIC BLOOD PRESSURE: 120 MMHG | BODY MASS INDEX: 31.9 KG/M2 | TEMPERATURE: 97.8 F | RESPIRATION RATE: 18 BRPM | DIASTOLIC BLOOD PRESSURE: 82 MMHG | OXYGEN SATURATION: 97 % | HEART RATE: 89 BPM

## 2021-07-16 DIAGNOSIS — R07.89 CHEST WALL PAIN: ICD-10-CM

## 2021-07-16 DIAGNOSIS — R07.9 CHEST PAIN: Primary | ICD-10-CM

## 2021-07-16 LAB
ANION GAP SERPL CALCULATED.3IONS-SCNC: 9 MMOL/L (ref 4–13)
ATRIAL RATE: 99 BPM
BASOPHILS # BLD AUTO: 0.08 THOUSANDS/ΜL (ref 0–0.1)
BASOPHILS NFR BLD AUTO: 1 % (ref 0–1)
BUN SERPL-MCNC: 14 MG/DL (ref 5–25)
CALCIUM SERPL-MCNC: 8.8 MG/DL (ref 8.3–10.1)
CHLORIDE SERPL-SCNC: 107 MMOL/L (ref 100–108)
CO2 SERPL-SCNC: 27 MMOL/L (ref 21–32)
CREAT SERPL-MCNC: 0.71 MG/DL (ref 0.6–1.3)
EOSINOPHIL # BLD AUTO: 0.17 THOUSAND/ΜL (ref 0–0.61)
EOSINOPHIL NFR BLD AUTO: 2 % (ref 0–6)
ERYTHROCYTE [DISTWIDTH] IN BLOOD BY AUTOMATED COUNT: 13.1 % (ref 11.6–15.1)
GFR SERPL CREATININE-BSD FRML MDRD: 105 ML/MIN/1.73SQ M
GLUCOSE SERPL-MCNC: 94 MG/DL (ref 65–140)
HCT VFR BLD AUTO: 42.4 % (ref 34.8–46.1)
HGB BLD-MCNC: 14.5 G/DL (ref 11.5–15.4)
IMM GRANULOCYTES # BLD AUTO: 0.03 THOUSAND/UL (ref 0–0.2)
IMM GRANULOCYTES NFR BLD AUTO: 0 % (ref 0–2)
LYMPHOCYTES # BLD AUTO: 2.01 THOUSANDS/ΜL (ref 0.6–4.47)
LYMPHOCYTES NFR BLD AUTO: 21 % (ref 14–44)
MCH RBC QN AUTO: 31.9 PG (ref 26.8–34.3)
MCHC RBC AUTO-ENTMCNC: 34.2 G/DL (ref 31.4–37.4)
MCV RBC AUTO: 93 FL (ref 82–98)
MONOCYTES # BLD AUTO: 0.64 THOUSAND/ΜL (ref 0.17–1.22)
MONOCYTES NFR BLD AUTO: 7 % (ref 4–12)
NEUTROPHILS # BLD AUTO: 6.78 THOUSANDS/ΜL (ref 1.85–7.62)
NEUTS SEG NFR BLD AUTO: 69 % (ref 43–75)
NRBC BLD AUTO-RTO: 0 /100 WBCS
P AXIS: 69 DEGREES
PLATELET # BLD AUTO: 307 THOUSANDS/UL (ref 149–390)
PMV BLD AUTO: 10.9 FL (ref 8.9–12.7)
POTASSIUM SERPL-SCNC: 4 MMOL/L (ref 3.5–5.3)
PR INTERVAL: 148 MS
QRS AXIS: 56 DEGREES
QRSD INTERVAL: 78 MS
QT INTERVAL: 338 MS
QTC INTERVAL: 433 MS
RBC # BLD AUTO: 4.54 MILLION/UL (ref 3.81–5.12)
SODIUM SERPL-SCNC: 143 MMOL/L (ref 136–145)
T WAVE AXIS: 23 DEGREES
TROPONIN I SERPL-MCNC: <0.02 NG/ML
VENTRICULAR RATE: 99 BPM
WBC # BLD AUTO: 9.71 THOUSAND/UL (ref 4.31–10.16)

## 2021-07-16 PROCEDURE — 96374 THER/PROPH/DIAG INJ IV PUSH: CPT

## 2021-07-16 PROCEDURE — 93005 ELECTROCARDIOGRAM TRACING: CPT

## 2021-07-16 PROCEDURE — 84484 ASSAY OF TROPONIN QUANT: CPT | Performed by: EMERGENCY MEDICINE

## 2021-07-16 PROCEDURE — 99285 EMERGENCY DEPT VISIT HI MDM: CPT

## 2021-07-16 PROCEDURE — 85025 COMPLETE CBC W/AUTO DIFF WBC: CPT | Performed by: EMERGENCY MEDICINE

## 2021-07-16 PROCEDURE — 71046 X-RAY EXAM CHEST 2 VIEWS: CPT

## 2021-07-16 PROCEDURE — 99285 EMERGENCY DEPT VISIT HI MDM: CPT | Performed by: EMERGENCY MEDICINE

## 2021-07-16 PROCEDURE — 36415 COLL VENOUS BLD VENIPUNCTURE: CPT | Performed by: EMERGENCY MEDICINE

## 2021-07-16 PROCEDURE — 80048 BASIC METABOLIC PNL TOTAL CA: CPT | Performed by: EMERGENCY MEDICINE

## 2021-07-16 PROCEDURE — 93010 ELECTROCARDIOGRAM REPORT: CPT | Performed by: INTERNAL MEDICINE

## 2021-07-16 RX ORDER — KETOROLAC TROMETHAMINE 30 MG/ML
15 INJECTION, SOLUTION INTRAMUSCULAR; INTRAVENOUS ONCE
Status: COMPLETED | OUTPATIENT
Start: 2021-07-16 | End: 2021-07-16

## 2021-07-16 RX ORDER — DICLOFENAC SODIUM 25 MG/1
25 TABLET, DELAYED RELEASE ORAL 2 TIMES DAILY
Qty: 14 TABLET | Refills: 0 | Status: SHIPPED | OUTPATIENT
Start: 2021-07-16 | End: 2021-08-06 | Stop reason: ALTCHOICE

## 2021-07-16 RX ADMIN — KETOROLAC TROMETHAMINE 15 MG: 30 INJECTION, SOLUTION INTRAMUSCULAR; INTRAVENOUS at 11:07
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
## 2021-08-02 ENCOUNTER — HOSPITAL ENCOUNTER (EMERGENCY)
Facility: HOSPITAL | Age: 41
Discharge: HOME | End: 2021-08-02
Attending: EMERGENCY MEDICINE
Payer: MEDICARE

## 2021-08-02 ENCOUNTER — APPOINTMENT (EMERGENCY)
Dept: RADIOLOGY | Facility: HOSPITAL | Age: 41
End: 2021-08-02
Payer: MEDICARE

## 2021-08-02 VITALS
DIASTOLIC BLOOD PRESSURE: 64 MMHG | WEIGHT: 186.07 LBS | RESPIRATION RATE: 14 BRPM | OXYGEN SATURATION: 97 % | BODY MASS INDEX: 31.94 KG/M2 | HEART RATE: 88 BPM | SYSTOLIC BLOOD PRESSURE: 140 MMHG | TEMPERATURE: 97.8 F

## 2021-08-02 DIAGNOSIS — S90.32XA CONTUSION OF LEFT FOOT: Primary | ICD-10-CM

## 2021-08-02 PROCEDURE — 73610 X-RAY EXAM OF ANKLE: CPT

## 2021-08-02 PROCEDURE — 99284 EMERGENCY DEPT VISIT MOD MDM: CPT | Performed by: EMERGENCY MEDICINE

## 2021-08-02 PROCEDURE — 73630 X-RAY EXAM OF FOOT: CPT

## 2021-08-02 PROCEDURE — 99283 EMERGENCY DEPT VISIT LOW MDM: CPT

## 2021-08-02 RX ORDER — ACETAMINOPHEN 325 MG/1
650 TABLET ORAL ONCE
Status: COMPLETED | OUTPATIENT
Start: 2021-08-02 | End: 2021-08-02

## 2021-08-02 RX ORDER — NAPROXEN 500 MG/1
500 TABLET ORAL 2 TIMES DAILY WITH MEALS
Qty: 20 TABLET | Refills: 0 | Status: SHIPPED | OUTPATIENT
Start: 2021-08-02 | End: 2021-08-06 | Stop reason: SDDI

## 2021-08-02 RX ADMIN — ACETAMINOPHEN 650 MG: 325 TABLET, FILM COATED ORAL at 14:38

## 2021-08-02 NOTE — Clinical Note
Joleen Judd was seen and treated in our emergency department on 8/2/2021  Diagnosis:      Dorota Martinez  may return to work on return date  She may return on this date: 08/05/2021          If you have any questions or concerns, please don't hesitate to call        Shree Gupta 24, DO    ______________________________           _______________          _______________  Hospital Representative                              Date                                Time

## 2021-08-02 NOTE — ED PROVIDER NOTES
History  Chief Complaint   Patient presents with    Foot Injury     Pt reports when hiking hit a rock and hurt left ankle yesterday - pt reports a 2 foot fall to where she hit her rock      42 y o  F p/w left foot pain x yesterday  Pt states she jumped yesterday and hit bottom of foot on a rock  Having pain along the bottom of her toes and ball of her left foot  Denies ankle pain  History provided by:  Patient   used: No    Ankle Injury  Location:  Left  Duration:  1 day  Timing:  Constant  Progression:  Unchanged  Chronicity:  New  Relieved by:  None tried  Worsened by:  Weight bearing  Ineffective treatments:  None tried      Prior to Admission Medications   Prescriptions Last Dose Informant Patient Reported? Taking?    Apremilast (OTEZLA PO)   Yes No   Sig: Take 40 mg by mouth 2 (two) times a day   cyclobenzaprine (FLEXERIL) 5 mg tablet   No No   Sig: Take 1 tablet (5 mg total) by mouth 3 (three) times a day as needed for muscle spasms   diclofenac (VOLTAREN) 25 MG EC tablet   No No   Sig: Take 2 tablets (50 mg total) by mouth 2 (two) times a day   diclofenac (VOLTAREN) 25 MG EC tablet   No No   Sig: Take 1 tablet (25 mg total) by mouth 2 (two) times a day   famotidine (PEPCID) 20 mg tablet   No No   Sig: Take 1 tablet (20 mg total) by mouth 2 (two) times a day   Patient not taking: Reported on 7/2/2019   loratadine (CLARITIN) 10 mg tablet   No No   Sig: Take 1 tablet (10 mg total) by mouth daily   methocarbamol (ROBAXIN) 750 mg tablet   No No   Sig: Take 1 tablet (750 mg total) by mouth every 6 (six) hours as needed for muscle spasms   naproxen (NAPROSYN) 500 mg tablet   No No   Sig: Take 1 tablet (500 mg total) by mouth 2 (two) times a day with meals for 5 days   predniSONE 50 mg tablet  Self Yes No   Sig: Take 100 mg by mouth daily      Facility-Administered Medications: None       Past Medical History:   Diagnosis Date    Blind left eye     Major depression 2013    Prosthetic eye globe     Pyelonephritis 2014    w/sepsis       Past Surgical History:   Procedure Laterality Date     SECTION      x3    EYE SURGERY         Family History   Problem Relation Age of Onset    Diabetes Family         Mellitus    Diabetes Mother         Mellitus     I have reviewed and agree with the history as documented  E-Cigarette/Vaping    E-Cigarette Use Never User      E-Cigarette/Vaping Substances     Social History     Tobacco Use    Smoking status: Current Every Day Smoker     Packs/day: 0 25     Years: 4 00     Pack years: 1 00     Types: Cigarettes    Smokeless tobacco: Never Used   Vaping Use    Vaping Use: Never used   Substance Use Topics    Alcohol use: Yes     Comment: occasional     Drug use: No       Review of Systems   Musculoskeletal:        Left foot pain   Neurological: Negative for weakness and numbness  All other systems reviewed and are negative  Physical Exam  Physical Exam  Vitals and nursing note reviewed  Constitutional:       General: She is not in acute distress  Appearance: She is well-developed  She is not ill-appearing, toxic-appearing or diaphoretic  HENT:      Right Ear: External ear normal       Left Ear: External ear normal    Eyes:      General: No scleral icterus  Conjunctiva/sclera:      Right eye: Right conjunctiva is not injected  Left eye: Left conjunctiva is not injected  Neck:      Vascular: No JVD  Trachea: Phonation normal    Cardiovascular:      Pulses: Normal pulses  Dorsalis pedis pulses are 2+ on the left side  Pulmonary:      Effort: Pulmonary effort is normal       Breath sounds: No stridor  Musculoskeletal:         General: No tenderness or deformity  Normal range of motion  Left foot: No deformity  Comments: No gross deformity  No obvious swelling  No TTP to lateral/medial malleoli  No TTP at fibular head    No TTP at navicular or base of 5th metatarsal    TTP to ball of left foot and toes   Full PROM and AROM with plantar flexion, dorsiflexion, inversion and eversion without pain  NVI  Skin:     General: Skin is warm and dry  Findings: Bruising (Bottom of left foot) present  No abrasion, ecchymosis, erythema, laceration or rash  Comments: No signs of septic joint  No crepitus  No discoloration  No excessive warmth  Neurological:      Mental Status: She is alert  Sensory: No sensory deficit  Motor: Motor function is intact  Psychiatric:         Behavior: Behavior normal          Vital Signs  ED Triage Vitals   Temperature Pulse Respirations Blood Pressure SpO2   08/02/21 1347 08/02/21 1346 08/02/21 1346 08/02/21 1346 08/02/21 1346   97 8 °F (36 6 °C) 88 14 140/64 97 %      Temp Source Heart Rate Source Patient Position - Orthostatic VS BP Location FiO2 (%)   08/02/21 1347 08/02/21 1346 08/02/21 1346 08/02/21 1346 --   Oral Monitor Sitting Right arm       Pain Score       08/02/21 1438       6           Vitals:    08/02/21 1346   BP: 140/64   Pulse: 88   Patient Position - Orthostatic VS: Sitting         Visual Acuity      ED Medications  Medications   acetaminophen (TYLENOL) tablet 650 mg (650 mg Oral Given 8/2/21 1438)       Diagnostic Studies  Results Reviewed     None                 XR foot 3+ views LEFT   ED Interpretation by Phuc Henao DO (08/02 1451)   No fracture      XR ankle 3+ vw left   ED Interpretation by Phuc Henao DO (08/02 1451)   No fracture                 Procedures  Procedures         ED Course  ED Course as of Aug 02 1526   Mon Aug 02, 2021   1454 Updated pt on xray results  Will place ace wrap and give crutches for comfort  Pt wants work note until Thursday                                                MDM    Disposition  Final diagnoses:   Contusion of left foot     Time reflects when diagnosis was documented in both MDM as applicable and the Disposition within this note     Time User Action Codes Description Comment    8/2/2021 2:52 PM Sujatha Carpenetr Add [S90 32XA] Contusion of left foot       ED Disposition     ED Disposition Condition Date/Time Comment    Discharge Stable Mon Aug 2, 2021  2:55 PM Memphisalvarez Gonzáles discharge to home/self care              Follow-up Information     Follow up With Specialties Details Why Contact Info Additional Information    [facility] Podiat[facility] Schedule an appointment as soon as possible for a visit  If your pain persists [address]  [phone]  [address] [facility] José Luis, [address], [address], [place], [place], [address] [facility]          Discharge Medication List as of 8/2/2021  2:55 PM      CONTINUE these medications which have CHANGED    Details   naproxen (NAPROSYN) 500 mg tablet Take 1 tablet (500 mg total) by mouth 2 (two) times a day with meals, Starting Mon 8/2/2021, Print         CONTINUE these medications which have NOT CHANGED    Details   Apremilast (OTEZLA PO) Take 40 mg by mouth 2 (two) times a day, Historical Med      cyclobenzaprine (FLEXERIL) 5 mg tablet Take 1 tablet (5 mg total) by mouth 3 (three) times a day as needed for muscle spasms, Starting Mon 10/12/2020, Phone In      !! diclofenac (VOLTAREN) 25 MG EC tablet Take 2 tablets (50 mg total) by mouth 2 (two) times a day, Starting Tue 7/2/2019, Print      !! diclofenac (VOLTAREN) 25 MG EC tablet Take 1 tablet (25 mg total) by mouth 2 (two) times a day, Starting Fri 7/16/2021, Print      famotidine (PEPCID) 20 mg tablet Take 1 tablet (20 mg total) by mouth 2 (two) times a day, Starting Mon 6/24/2019, Print      loratadine (CLARITIN) 10 mg tablet Take 1 tablet (10 mg total) by mouth daily, Starting Tue 6/29/2021, Normal      methocarbamol (ROBAXIN) 750 mg tablet Take 1 tablet (750 mg total) by mouth every 6 (six) hours as needed for muscle spasms, Starting Fri 10/16/2020, Normal      predniSONE 50 mg tablet Take 100 mg by mouth daily, Historical Med !! - Potential duplicate medications found  Please discuss with provider  No discharge procedures on file      PDMP Review       Value Time User    PDMP Reviewed  Yes 10/16/2020  4:36 PM Aram Parra PA-C          ED Provider  Electronically Signed by           Shree Gupta 24, DO 08/02/21 1521
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
## 2021-08-06 ENCOUNTER — OFFICE VISIT (OUTPATIENT)
Dept: FAMILY MEDICINE CLINIC | Facility: CLINIC | Age: 41
End: 2021-08-06

## 2021-08-06 VITALS
HEIGHT: 64 IN | SYSTOLIC BLOOD PRESSURE: 122 MMHG | DIASTOLIC BLOOD PRESSURE: 74 MMHG | OXYGEN SATURATION: 97 % | HEART RATE: 101 BPM | TEMPERATURE: 95.5 F | BODY MASS INDEX: 31.24 KG/M2 | RESPIRATION RATE: 20 BRPM | WEIGHT: 183 LBS

## 2021-08-06 DIAGNOSIS — Z23 ENCOUNTER FOR IMMUNIZATION: ICD-10-CM

## 2021-08-06 DIAGNOSIS — Z12.31 ENCOUNTER FOR SCREENING MAMMOGRAM FOR BREAST CANCER: ICD-10-CM

## 2021-08-06 DIAGNOSIS — S90.32XD CONTUSION OF LEFT FOOT, SUBSEQUENT ENCOUNTER: Primary | ICD-10-CM

## 2021-08-06 PROBLEM — S90.32XA CONTUSION OF LEFT FOOT: Status: ACTIVE | Noted: 2021-08-06

## 2021-08-06 PROCEDURE — 99213 OFFICE O/P EST LOW 20 MIN: CPT | Performed by: FAMILY MEDICINE

## 2021-08-06 PROCEDURE — 90715 TDAP VACCINE 7 YRS/> IM: CPT | Performed by: FAMILY MEDICINE

## 2021-08-06 PROCEDURE — 90471 IMMUNIZATION ADMIN: CPT | Performed by: FAMILY MEDICINE

## 2021-08-06 RX ORDER — ADALIMUMAB 40MG/0.4ML
KIT SUBCUTANEOUS
COMMUNITY
Start: 2021-05-13

## 2021-08-06 NOTE — PATIENT INSTRUCTIONS
Contusion in Adults, Ambulatory Care   GENERAL INFORMATION:   A contusion  is a bruise that appears on your skin after an injury  A bruise happens when small blood vessels tear but skin does not  When blood vessels tear, blood leaks into nearby tissue, such as soft tissue or muscle  Common symptoms include the following:   · Pain that increases when you touch the bruise, walk, or use the area around the bruise    · Swelling or a lump at the site of the bruise or near it    · Red, blue, or black skin that may change to green or yellow after a few days    · Stiffness or problems moving the bruised area of your body  Seek immediate care for the following symptoms:   · New difficulty moving your injured area    · Tingling or numbness in or near the injured area    · Hand or foot below the bruise gets cold or turns pale  Treatment for a contusion  may include any of the following:  · NSAIDs  help decrease swelling and pain or fever  This medicine is available with or without a doctor's order  NSAIDs can cause stomach bleeding or kidney problems in certain people  If you take blood thinner medicine, always ask your healthcare provider if NSAIDs are safe for you  Always read the medicine label and follow directions  · Pain medicine  to decrease or take away pain  Do not wait until the pain is severe before you take your medicine  · Surgery  may be done to repair a tear in the muscle or relieve pressure in the muscle caused by swelling  Care for a contusion:   · Rest the injured area  or use it less than usual  If you bruised your leg or foot, you may need crutches or a cane to help you walk  This will help you keep weight off your injured body part  Use crutches or a cane as directed  · Use ice  to decrease swelling and pain  Ice may also help prevent tissue damage  Use an ice pack, or put crushed ice in a plastic bag   Cover it with a towel and place it on your bruise for 15 to 20 minutes every hour or as directed  · Use Compression  An elastic bandage may be wrapped around a bruised muscle to support the area and decrease swelling  Make sure the bandage is not too tight  You should be able to fit 1 finger between the bandage and your skin  · Elevate (raise) your injured body part  above the level of your heart to help decrease pain and swelling  Use pillows, blankets, or rolled towels to elevate the area as often as you can  · Do not massage or use heat  Heat and massage may slow healing of the area  · Do not drink alcohol  Alcohol may slow healing of your injury  · Do not stretch injured muscles  Ask your healthcare provider when and how you may safely stretch after your injury  Prevent a contusion:   · Stretch and warm up before you play sports or exercise  · Wear protective gear when you play sports  Examples are shin guards and padding  · If you begin a new physical activity, start slowly to give your body a chance to adjust   Follow up with your healthcare provider as directed:  Write down your questions so you remember to ask them during your visits  CARE AGREEMENT:   You have the right to help plan your care  Learn about your health condition and how it may be treated  Discuss treatment options with your caregivers to decide what care you want to receive  You always have the right to refuse treatment  The above information is an  only  It is not intended as medical advice for individual conditions or treatments  Talk to your doctor, nurse or pharmacist before following any medical regimen to see if it is safe and effective for you  © 2014 [address] is for End User's use only and may not be sold, redistributed or otherwise used for commercial purposes  All illustrations and images included in CareNotes® are the copyrighted property of A D A M , Inc  or Robby Chan

## 2021-08-06 NOTE — LETTER
August 6, 2021     Patient: Gabrielle Garcia   YOB: 1980   Date of Visit: 8/6/2021       To Whom it May Concern:    Kwais De La O is under my professional care  She was seen in my office on 8/6/2021  She may return to work on August 13, 2021  If you have any questions or concerns, please don't hesitate to call           Sincerely,          Piotr Brock MD        CC: No Recipients

## 2021-08-09 ENCOUNTER — TELEPHONE (OUTPATIENT)
Dept: FAMILY MEDICINE CLINIC | Facility: CLINIC | Age: 41
End: 2021-08-09

## 2021-08-09 NOTE — TELEPHONE ENCOUNTER
Pt called stating she was recently seen in the office and received a letter to return to work 21 but is asking if this can be extended til 21 states she has a  to attend to

## 2021-08-10 ENCOUNTER — TELEPHONE (OUTPATIENT)
Dept: FAMILY MEDICINE CLINIC | Facility: CLINIC | Age: 41
End: 2021-08-10

## 2021-08-12 ENCOUNTER — TELEPHONE (OUTPATIENT)
Dept: FAMILY MEDICINE CLINIC | Facility: CLINIC | Age: 41
End: 2021-08-12

## 2021-08-12 NOTE — TELEPHONE ENCOUNTER
Pt called in requesting a referral for podiatry pt has an appt on 8/16/21 she is also requesting an extension on her work note states she is still a having trouble walking and that podiatry told her to call her pcp for another note until they can see her on Monday as well as they are requesting for another xray on foot   Pt would like a call when possible

## 2021-08-13 ENCOUNTER — TELEPHONE (OUTPATIENT)
Dept: FAMILY MEDICINE CLINIC | Facility: CLINIC | Age: 41
End: 2021-08-13

## 2021-08-13 DIAGNOSIS — S90.32XD CONTUSION OF LEFT FOOT, SUBSEQUENT ENCOUNTER: Primary | ICD-10-CM

## 2021-08-13 NOTE — TELEPHONE ENCOUNTER
Placed call to patient to clarify needs, voicemail left  Called again few minutes after and no answer still  Once I am able to speak with patient, I will have forms completed, however she will need to come in to sign the release of information portion prior to me faxing the forms to [facility]  I will leave the forms in my box until then in case she is able to come in next week during the daytime to do so as I will be on night float rotation  They appear to be due prior to 8/25 however so I will try to get through to her and get this done prior to that time  I have placed the referral to Podiatry and Xray order as requested  Thank you!

## 2021-08-13 NOTE — TELEPHONE ENCOUNTER
Received call from [address] [place] [address], pt has podiatry appointment on 8/16  Referral needs to be placed

## 2021-08-16 ENCOUNTER — OFFICE VISIT (OUTPATIENT)
Dept: MULTI SPECIALTY CLINIC | Facility: CLINIC | Age: 41
End: 2021-08-16

## 2021-08-16 VITALS
DIASTOLIC BLOOD PRESSURE: 81 MMHG | TEMPERATURE: 97.4 F | HEART RATE: 99 BPM | SYSTOLIC BLOOD PRESSURE: 113 MMHG | BODY MASS INDEX: 31.76 KG/M2 | WEIGHT: 185 LBS

## 2021-08-16 DIAGNOSIS — S90.32XD CONTUSION OF LEFT FOOT, SUBSEQUENT ENCOUNTER: ICD-10-CM

## 2021-08-16 PROCEDURE — 99203 OFFICE O/P NEW LOW 30 MIN: CPT | Performed by: PODIATRIST

## 2021-08-16 NOTE — PROGRESS NOTES
[address] 42 y o  female MRN: [id number]  Encounter: [id number]      Assessment/Plan        Diagnoses and all orders for this visit:    Contusion of left foot, subsequent encounter  -     Ambulatory referral to Podiatry           Plan:   Patient was seen/examined  All questions and concerns addressed   Advised patient to wear surgical shoe on affected foot at all times while WB   Advised patient to continue NSAIDs and daily icing to help reduce inflammation   Work excuse note handed to patient   RTC in 1 weeks     Dr Cary Camera was present during this entire procedure  History of Present Illness     HPI:  Metatarsal pain    Patient complains of pain of the left 1st metatarsal  This is evaluated as a personal injury  The injury occurred about 2 weeks ago on 21  Since that time they have been experiencing left foot pain and swelling  The pain is currently rated moderate  They were originally seen at local emergency room where an x-ray was done, no fractures noted  The patient was subsequently referred to our clinic for further management  She presents today in slippers and without crutches  She reports that she has been taking naproxen as needed for pain and has been icing about twice a day  Review of Systems   Constitutional: Negative  HENT: Negative  Eyes: Negative  Respiratory: Negative  Cardiovascular: Negative  Gastrointestinal: Negative  Musculoskeletal: foot pain and as noted in HPI  Skin: mild edema at dorsal and plantar 1st MTPJ  Neurological: Negative         Historical Information   Past Medical History:   Diagnosis Date    Blind left eye     Major depression     Prosthetic eye globe     Pyelonephritis 2014    w/sepsis     Past Surgical History:   Procedure Laterality Date     SECTION      x3    EYE SURGERY       Social History   Social History     Substance and Sexual Activity   Alcohol Use Yes    Comment: occasional Social History     Substance and Sexual Activity   Drug Use No     Social History     Tobacco Use   Smoking Status Current Every Day Smoker    Packs/day: 0 25    Years: 4 00    Pack years: 1 00    Types: Cigarettes   Smokeless Tobacco Never Used     Family History:   Family History   Problem Relation Age of Onset    Diabetes Family         Mellitus    Diabetes Mother         Mellitus       Meds/Allergies   (Not in a hospital admission)    Allergies   Allergen Reactions    Morphine Sulfate Shortness Of Breath    Amoxicillin Hives    Clavulanic Acid Hives    Fentanyl     Lurasidone GI Intolerance    Morphine And Related        Objective     Current Vitals:   Blood Pressure: 113/81 (08/16/21 1504)  Pulse: 99 (08/16/21 1504)  Temperature: (!) 97 4 °F (36 3 °C) (08/16/21 1504)  Temp Source: Temporal (08/16/21 1504)  Weight - Scale: 83 9 kg (185 lb) (08/16/21 1504)        /81 (BP Location: Right arm, Patient Position: Sitting, Cuff Size: Standard)   Pulse 99   Temp (!) 97 4 °F (36 3 °C) (Temporal)   Wt 83 9 kg (185 lb)   BMI 31 76 kg/m²       Lower Extremity Exam:    Vascular: Right foot DP/PT +2                   Left foot DP/PT +2                   There is trace lower extremity edema left 1st MTPJ    Musculoskeletal: ROM and MMT not performed secondary to guarding from pain  There is moderate tenderness over the medial sesamoid  There are no foot deformities  Neurological: Sensation to 5 07 [place] nylon filament: negative bilaterally     Gross sensation is intact b/l feet  Dermatology: Skin Condition:  nails clear without discoloration or sign of infection and normal, mild edema at L 1st MTPJ     There is not evidence of macerated tissue between toe spaces  Nail Exam: normal nails without lesions       Open ulcerations: No     Calluses: No

## 2021-08-16 NOTE — LETTER
August 16, 2021     Patient: Poli Rogers   YOB: 1980   Date of Visit: 8/16/2021       To Whom it May Concern:    Miguelsarah Pololópez is under my professional care  She was seen in my office on 8/16/2021  She may return to work on until follow up evaluation  If you have any questions or concerns, please don't hesitate to call           Sincerely,          Dr Eladio Jean-Baptiste, DPM

## 2021-08-24 ENCOUNTER — OFFICE VISIT (OUTPATIENT)
Dept: MULTI SPECIALTY CLINIC | Facility: CLINIC | Age: 41
End: 2021-08-24

## 2021-08-24 VITALS
BODY MASS INDEX: 32.44 KG/M2 | WEIGHT: 190 LBS | SYSTOLIC BLOOD PRESSURE: 126 MMHG | DIASTOLIC BLOOD PRESSURE: 87 MMHG | HEART RATE: 88 BPM | TEMPERATURE: 98.1 F | HEIGHT: 64 IN

## 2021-08-24 DIAGNOSIS — S90.32XD CONTUSION OF LEFT FOOT, SUBSEQUENT ENCOUNTER: Primary | ICD-10-CM

## 2021-08-24 PROCEDURE — 99212 OFFICE O/P EST SF 10 MIN: CPT | Performed by: PODIATRIST

## 2021-08-24 NOTE — PROGRESS NOTES
[address] 42 y o  female MRN: [id number]  Encounter: [id number]    ASSESSMENT:       Diagnoses and all orders for this visit:    Contusion of left foot, subsequent encounter  -     Diclofenac Sodium (VOLTAREN) 1 %; Apply 2 g topically 4 (four) times a day  -     XR toe left great min 2 views; Future           PLAN:     Re-injury on  of the Left great toe with increase of pain   X-rays of left great toe ordered   Prescription of Voltaren gel to the affected area 4 times a day   Elevation ice encouraged    Wear postoperative shoe and if needed use crutches to be non-weight-bearing   RTC in 4 weeks  If symptoms resolve she may cancel the appointment      - Dr Renay Perkins was present for entirety of patient encounter  SUBJECTIVE:    History of Present Illness     Gigi Smith is a 42 y o  female who presents with severe pain to the left great toe base of the proximal phalanx  She states that pain was improving with postoperative shoe and ice and elevation  From last visit  But on  patient fell while at a  and hit the toe really hard again  Patient states that bit bruised and swelled up  It is gone down in swelling since the initial incident on   But the pain has remained constant  She states that the naproxen that she has been taking occasionally, makes her vomit  She states that she also vomits occasional taking Tylenol and other oral medications  She states that this may be because she is not eating when she takes no medications  She denies fever or chills  And she does eyes nausea symptoms other than when she tries to take naproxen  Review of Systems   Constitutional: Negative  HENT: Negative  Eyes: Negative  Respiratory: Negative  Cardiovascular: Negative  Gastrointestinal: Negative      Musculoskeletal:  Pain medial left great toe  Skin:  Swelling left great toe  Neurological: Negative        Historical Information   Past Medical History:   Diagnosis Date    Blind left eye     Major depression 2013    Prosthetic eye globe     Pyelonephritis 2014    w/sepsis     Past Surgical History:   Procedure Laterality Date     SECTION      x3    EYE SURGERY       Social History   Social History     Substance and Sexual Activity   Alcohol Use Yes    Comment: occasional      Social History     Substance and Sexual Activity   Drug Use No     Social History     Tobacco Use   Smoking Status Current Every Day Smoker    Packs/day: 0 25    Years: 4 00    Pack years: 1 00    Types: Cigarettes   Smokeless Tobacco Never Used     Family History:   Family History   Problem Relation Age of Onset    Diabetes Family         Mellitus    Diabetes Mother         Mellitus       Meds/Allergies   (Not in a hospital admission)    Allergies   Allergen Reactions    Morphine Sulfate Shortness Of Breath    Amoxicillin Hives    Clavulanic Acid Hives    Fentanyl     Lurasidone GI Intolerance    Morphine And Related          OBJECTIVE:    Current Vitals:   Blood Pressure: 126/87 (21 1056)  Pulse: 88 (21 105)  Temperature: 98 1 °F (36 7 °C) (21 1056)  Temp Source: Temporal (21)  Height: 5' 4" (162 6 cm) (21)  Weight - Scale: 86 2 kg (190 lb) (21 1056)        /87 (BP Location: Right arm, Patient Position: Sitting, Cuff Size: Adult)   Pulse 88   Temp 98 1 °F (36 7 °C) (Temporal)   Ht 5' 4" (1 626 m)   Wt 86 2 kg (190 lb)   BMI 32 61 kg/m²       Lower Extremity Exam:    Physical Exam:    Musculoskeletal:  MMT is 5/5 to all compartments B/L, Hallux ROM is < 65 degrees B/L, Ankle dorsiflexion < 10 degrees from neutral with leg extended B/L, no pain or guarding during passive joint ROM  Active ROM to the digits intact  Pain on palpation of the left great toe worse at the medial plantar base of the proximal phalanx  No gross deformities noted  Cardiovascular:   Pedal pulses palpable, +2/4 edema B/L, CFT< 3 sec to digits  Pedal hair is Present  Pulse has regular rate and rhythm  Skin temperature warm to warm B/L  No calf tenderness  Dermatological:  No open Lesions  Skin of the LE is normal temperature, texture, turgor  There is moderate swelling surrounding the left great toe  No erythema present  No change in temperature  Neurological:  AAOx3  Gross sensation is intact  Vibratory sensation Intact  Protective sensation is Intact  Imaging: I have personally reviewed pertinent films in PACS  EKG, Pathology, and Other Studies: I have personally reviewed pertinent reports  ** Please Note: Portions of the record may have been created with voice recognition software  Occasional wrong word or "sound a like" substitutions may have occurred due to the inherent limitations of voice recognition software  Read the chart carefully and recognize, using context, where substitutions have occurred   **

## 2021-08-24 NOTE — LETTER
August 24, 2021     Patient: Meaghan Jones   YOB: 1980   Date of Visit: 8/24/2021       To Whom it May Concern:    Elisabeth Gomez is under my professional care  She was seen in my office on 8/24/2021  She may return to work on 9/30/2021 if she is cleared on her next appointment       If you have any questions or concerns, please don't hesitate to call           Sincerely,          [facility]        CC: No Recipients

## 2021-09-01 ENCOUNTER — TELEPHONE (OUTPATIENT)
Dept: FAMILY MEDICINE CLINIC | Facility: CLINIC | Age: 41
End: 2021-09-01

## 2021-09-01 NOTE — TELEPHONE ENCOUNTER
SIGNATURES NEEDED FOR  [employer]  FORM RECEIVED VIA FAX  WILL BE PLACED IN FORM BIN FOR MA PICKUP

## 2021-09-07 NOTE — TELEPHONE ENCOUNTER
Form completed by provider on 09/07/21    Faxed to [phone]  Confirmation received    Completed form placed in pod   folder

## 2021-09-30 ENCOUNTER — TELEPHONE (OUTPATIENT)
Dept: FAMILY MEDICINE CLINIC | Facility: CLINIC | Age: 41
End: 2021-09-30

## 2021-10-05 ENCOUNTER — OFFICE VISIT (OUTPATIENT)
Dept: MULTI SPECIALTY CLINIC | Facility: CLINIC | Age: 41
End: 2021-10-05

## 2021-10-05 VITALS
WEIGHT: 191 LBS | HEART RATE: 94 BPM | SYSTOLIC BLOOD PRESSURE: 133 MMHG | DIASTOLIC BLOOD PRESSURE: 93 MMHG | BODY MASS INDEX: 32.61 KG/M2 | HEIGHT: 64 IN

## 2021-10-05 DIAGNOSIS — M79.672 LEFT FOOT PAIN: ICD-10-CM

## 2021-10-05 DIAGNOSIS — S90.32XD CONTUSION OF LEFT FOOT, SUBSEQUENT ENCOUNTER: Primary | ICD-10-CM

## 2021-10-05 PROCEDURE — 99213 OFFICE O/P EST LOW 20 MIN: CPT | Performed by: PODIATRIST

## 2021-10-19 ENCOUNTER — TELEPHONE (OUTPATIENT)
Dept: INTERNAL MEDICINE CLINIC | Facility: CLINIC | Age: 41
End: 2021-10-19

## 2021-11-01 ENCOUNTER — HOSPITAL ENCOUNTER (OUTPATIENT)
Dept: MRI IMAGING | Facility: HOSPITAL | Age: 41
Discharge: HOME | End: 2021-11-01

## 2021-11-01 DIAGNOSIS — M79.672 LEFT FOOT PAIN: ICD-10-CM

## 2021-11-01 DIAGNOSIS — S90.32XD CONTUSION OF LEFT FOOT, SUBSEQUENT ENCOUNTER: ICD-10-CM

## 2021-11-16 ENCOUNTER — OFFICE VISIT (OUTPATIENT)
Dept: MULTI SPECIALTY CLINIC | Facility: CLINIC | Age: 41
End: 2021-11-16

## 2021-11-16 VITALS
BODY MASS INDEX: 32.96 KG/M2 | WEIGHT: 192 LBS | SYSTOLIC BLOOD PRESSURE: 134 MMHG | HEART RATE: 92 BPM | TEMPERATURE: 98 F | DIASTOLIC BLOOD PRESSURE: 87 MMHG

## 2021-11-16 DIAGNOSIS — S90.32XD CONTUSION OF LEFT FOOT, SUBSEQUENT ENCOUNTER: ICD-10-CM

## 2021-11-16 DIAGNOSIS — M79.672 LEFT FOOT PAIN: ICD-10-CM

## 2021-11-16 DIAGNOSIS — M25.872 SESAMOIDITIS OF LEFT FOOT: ICD-10-CM

## 2021-11-16 DIAGNOSIS — M79.672 LEFT FOOT PAIN: Primary | ICD-10-CM

## 2021-11-16 PROCEDURE — 99213 OFFICE O/P EST LOW 20 MIN: CPT | Performed by: PODIATRIST

## 2021-11-16 RX ORDER — MELOXICAM 15 MG/1
15 TABLET ORAL DAILY
Qty: 30 TABLET | Refills: 2 | Status: SHIPPED | OUTPATIENT
Start: 2021-11-16 | End: 2021-11-18

## 2021-11-17 ENCOUNTER — TELEPHONE (OUTPATIENT)
Dept: INTERNAL MEDICINE CLINIC | Facility: CLINIC | Age: 41
End: 2021-11-17

## 2021-11-18 RX ORDER — MELOXICAM 15 MG/1
TABLET ORAL
Qty: 90 TABLET | Refills: 0 | Status: SHIPPED | OUTPATIENT
Start: 2021-11-18

## 2021-11-24 ENCOUNTER — TELEPHONE (OUTPATIENT)
Dept: FAMILY MEDICINE CLINIC | Facility: CLINIC | Age: 41
End: 2021-11-24

## 2021-11-26 ENCOUNTER — TELEPHONE (OUTPATIENT)
Dept: INTERNAL MEDICINE CLINIC | Facility: CLINIC | Age: 41
End: 2021-11-26

## 2021-11-30 ENCOUNTER — TELEPHONE (OUTPATIENT)
Dept: INTERNAL MEDICINE CLINIC | Facility: CLINIC | Age: 41
End: 2021-11-30

## 2021-12-07 ENCOUNTER — TELEPHONE (OUTPATIENT)
Dept: INTERNAL MEDICINE CLINIC | Facility: CLINIC | Age: 41
End: 2021-12-07

## 2021-12-08 ENCOUNTER — TELEPHONE (OUTPATIENT)
Dept: INTERNAL MEDICINE CLINIC | Facility: CLINIC | Age: 41
End: 2021-12-08

## 2021-12-13 ENCOUNTER — TELEPHONE (OUTPATIENT)
Dept: INTERNAL MEDICINE CLINIC | Facility: CLINIC | Age: 41
End: 2021-12-13

## 2021-12-17 ENCOUNTER — TELEPHONE (OUTPATIENT)
Dept: INTERNAL MEDICINE CLINIC | Facility: CLINIC | Age: 41
End: 2021-12-17

## 2021-12-21 ENCOUNTER — OFFICE VISIT (OUTPATIENT)
Dept: MULTI SPECIALTY CLINIC | Facility: CLINIC | Age: 41
End: 2021-12-21

## 2021-12-21 VITALS
BODY MASS INDEX: 32.44 KG/M2 | SYSTOLIC BLOOD PRESSURE: 115 MMHG | HEART RATE: 100 BPM | HEIGHT: 64 IN | WEIGHT: 190 LBS | OXYGEN SATURATION: 98 % | DIASTOLIC BLOOD PRESSURE: 83 MMHG | TEMPERATURE: 98.2 F

## 2021-12-21 DIAGNOSIS — M25.872 SESAMOIDITIS OF LEFT FOOT: ICD-10-CM

## 2021-12-21 DIAGNOSIS — G89.29 CHRONIC FOOT PAIN, LEFT: ICD-10-CM

## 2021-12-21 DIAGNOSIS — M20.12 HAV (HALLUX ABDUCTO VALGUS), LEFT: ICD-10-CM

## 2021-12-21 DIAGNOSIS — S90.32XD CONTUSION OF LEFT FOOT, SUBSEQUENT ENCOUNTER: Primary | ICD-10-CM

## 2021-12-21 DIAGNOSIS — M79.672 CHRONIC FOOT PAIN, LEFT: ICD-10-CM

## 2021-12-21 PROCEDURE — 99213 OFFICE O/P EST LOW 20 MIN: CPT | Performed by: PODIATRIST

## 2021-12-21 RX ORDER — METHYLPREDNISOLONE 4 MG/1
TABLET ORAL
Qty: 21 EACH | Refills: 0 | Status: SHIPPED | OUTPATIENT
Start: 2021-12-21

## 2022-01-19 ENCOUNTER — TELEPHONE (OUTPATIENT)
Dept: INTERNAL MEDICINE CLINIC | Facility: CLINIC | Age: 42
End: 2022-01-19

## 2022-01-19 NOTE — TELEPHONE ENCOUNTER
Folder Color- Yellow    Name of [facility] #[id number]    Form to be filled out by- PODIATRY    Form to be Faxed [phone]    Patient made aware of 10 business day policy

## 2022-01-24 ENCOUNTER — OFFICE VISIT (OUTPATIENT)
Dept: PODIATRY | Facility: CLINIC | Age: 42
End: 2022-01-24
Payer: MEDICARE

## 2022-01-24 VITALS
BODY MASS INDEX: 32.61 KG/M2 | WEIGHT: 191 LBS | HEART RATE: 102 BPM | HEIGHT: 64 IN | DIASTOLIC BLOOD PRESSURE: 94 MMHG | SYSTOLIC BLOOD PRESSURE: 130 MMHG

## 2022-01-24 DIAGNOSIS — M25.872 SESAMOIDITIS OF LEFT FOOT: ICD-10-CM

## 2022-01-24 DIAGNOSIS — M25.572 PAIN IN JOINT, FOOT, LEFT: Primary | ICD-10-CM

## 2022-01-24 DIAGNOSIS — S90.32XA CONTUSION OF LEFT FOOT, INITIAL ENCOUNTER: ICD-10-CM

## 2022-01-24 PROCEDURE — 99203 OFFICE O/P NEW LOW 30 MIN: CPT | Performed by: PODIATRIST

## 2022-01-24 PROCEDURE — 20550 NJX 1 TENDON SHEATH/LIGAMENT: CPT | Performed by: PODIATRIST

## 2022-01-24 RX ORDER — LIDOCAINE HYDROCHLORIDE 10 MG/ML
1 INJECTION, SOLUTION EPIDURAL; INFILTRATION; INTRACAUDAL; PERINEURAL ONCE
Status: COMPLETED | OUTPATIENT
Start: 2022-01-24 | End: 2022-01-24

## 2022-01-24 RX ORDER — TRIAMCINOLONE ACETONIDE 40 MG/ML
20 INJECTION, SUSPENSION INTRA-ARTICULAR; INTRAMUSCULAR ONCE
Status: COMPLETED | OUTPATIENT
Start: 2022-01-24 | End: 2022-01-24

## 2022-01-24 RX ADMIN — TRIAMCINOLONE ACETONIDE 20 MG: 40 INJECTION, SUSPENSION INTRA-ARTICULAR; INTRAMUSCULAR at 09:27

## 2022-01-24 RX ADMIN — LIDOCAINE HYDROCHLORIDE 1 ML: 10 INJECTION, SOLUTION EPIDURAL; INFILTRATION; INTRACAUDAL; PERINEURAL at 09:27
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
## 2022-01-24 NOTE — LETTER
January 24, 2022     Patient: Bhanu Anderson   YOB: 1980   Date of Visit: 1/24/2022       To Whom it May Concern:    Wininedoris Jensen is under my professional care  She was seen in my office on 1/24/2022  She may return to work on 2/24/22 pain permitted  If you have any questions or concerns, please don't hesitate to call  Sincerely,          Kelley Jacobo DPM        CC: Dolly Horowitz

## 2022-02-08 NOTE — TELEPHONE ENCOUNTER
Per verbal communication you will reach out to patient to see if this form is needed  Patient seen in private podiatry office

## 2022-02-08 NOTE — TELEPHONE ENCOUNTER
Podiatry is unable to complete form as patient is now following with DR Lockett's office  I attempted to call patient  Voicemail was full and I was unable to leave a message  I will try to call again

## 2022-02-14 NOTE — TELEPHONE ENCOUNTER
3rd message left for patient  Patient has follow ups scheduled with Dr Shannan Reyes  He will handle any follow up from here  DETOX  CONSULT    Pt admitted from ED  for palpitations/etoh w/d  H/O MVP per pt  Numerous detox admissions  did a 2wk inpt etoh rehab in [facility] in 3/2019  enrolled in [facility] outpt program at intake    Pt interviewed, examined and EMR chart reviewed.  Hx of ETOH abuse, has been drinking for __many___ years/months  variable periods of sobriety in the past.  Has been in detox before __x___yes,   _____No  Sz Hx (-)  SOCIAL HISTORY:etoh    REVIEW OF SYSTEMS:    Constitutional: No fever, weight loss or fatigue  ENT:  No difficulty hearing, tinnitus, vertigo; No sinus or throat pain  Neck: No pain or stiffness  Respiratory: No cough, wheezing, chills or hemoptysis  Cardiovascular: No chest pain, (+) palpitations, shortness of breath, dizziness or leg swelling  Gastrointestinal: No abdominal or epigastric pain. No nausea, vomiting or hematemesis; No diarrhea or constipation. No melena or hematochezia.  Neurological: No headaches, memory loss, loss of strength, numbness or tremors  Musculoskeletal: No joint pain or swelling; No muscle, back or extremity pain  Psychiatric: No depression, (+) anxiety, mood swings or difficulty sleeping    MEDICATIONS  (STANDING):  atorvastatin 40 milliGRAM(s) Oral at bedtime  folic acid 1 milliGRAM(s) Oral daily  gabapentin 800 milliGRAM(s) Oral three times a day  lactated ringers. 1000 milliLiter(s) (100 mL/Hr) IV Continuous <Continuous>  LORazepam     Tablet   Oral   LORazepam     Tablet 1 milliGRAM(s) Oral every 4 hours  mirtazapine 30 milliGRAM(s) Oral at bedtime  multivitamin 1 Tablet(s) Oral daily  pantoprazole    Tablet 40 milliGRAM(s) Oral before breakfast  thiamine 100 milliGRAM(s) Oral daily    MEDICATIONS  (PRN):  clonazePAM Tablet 1 milliGRAM(s) Oral two times a day PRN anxiety  LORazepam     Tablet 2 milliGRAM(s) Oral every 4 hours PRN Agitation  LORazepam     Tablet 2 milliGRAM(s) Oral every 4 hours PRN alcohol withdrawal symptoms  traZODone 50 milliGRAM(s) Oral at bedtime PRN insomnia      Vital Signs Last 24 Hrs  T(C): 35.8 (29 Apr 2019 05:32), Max: 36.3 (28 Apr 2019 22:07)  T(F): 96.4 (29 Apr 2019 05:32), Max: 97.3 (28 Apr 2019 22:07)  HR: 76 (29 Apr 2019 05:32) (73 - 77)  BP: 140/76 (29 Apr 2019 05:32) (104/60 - 143/80)  BP(mean): --  RR: 18 (29 Apr 2019 05:32) (16 - 18)  SpO2: 98% (28 Apr 2019 22:07) (98% - 98%)    PHYSICAL EXAM:    Constitutional: NAD, well-groomed, well-developed  HEENT: PERRLA, EOMI, Normal Hearing, MMM  Neck: No LAD, No JVD  Back: Normal spine flexure, No CVA tenderness  Respiratory: CTAB/L  Cardiovascular: S1 and S2, RRR, no M/G/R  Gastrointestinal: BS+, soft, NT/ND  Extremities: No peripheral edema  Vascular: 2+ peripheral pulses  Neurological: A/O x 3, no focal deficits    LABS:                        11.9   2.82  )-----------( 105      ( 28 Apr 2019 07:09 )             35.5     04-28    142  |  105  |  3<L>  ----------------------------<  84  3.6   |  25  |  0.6<L>    Ca    8.6      28 Apr 2019 07:09  Mg     1.6     04-28          Drug Screen Urine:  Alcohol Level  Alcohol, Blood: 322 mg/dL (04-26-19 @ 22:55)        RADIOLOGY & ADDITIONAL STUDIES:  reviewed in PACS

## 2022-02-21 ENCOUNTER — OFFICE VISIT (OUTPATIENT)
Dept: PODIATRY | Facility: CLINIC | Age: 42
End: 2022-02-21
Payer: MEDICARE

## 2022-02-21 VITALS
DIASTOLIC BLOOD PRESSURE: 89 MMHG | HEART RATE: 99 BPM | BODY MASS INDEX: 33.29 KG/M2 | WEIGHT: 195 LBS | HEIGHT: 64 IN | SYSTOLIC BLOOD PRESSURE: 127 MMHG

## 2022-02-21 DIAGNOSIS — M25.872 SESAMOIDITIS OF LEFT FOOT: Primary | ICD-10-CM

## 2022-02-21 PROCEDURE — 99212 OFFICE O/P EST SF 10 MIN: CPT | Performed by: PODIATRIST

## 2022-02-21 NOTE — PROGRESS NOTES
Patient presents for assessment of left foot  Patient still dealing with tibial sesamoiditis pain  Cortisone injection was helpful  However patient rates pain still as a 4/10  Discussed treatment options  Patient has failed 3 cortisone injections as far as maximal pain relief  We could perform a 4th but prefer to hold off if possible  Advised patient to bring her running shoes in at next visit and I will try to apply a dancer's pad to the insole  Patient also considering tibial sesamoidectomy in the future

## 2022-03-14 ENCOUNTER — OFFICE VISIT (OUTPATIENT)
Dept: PODIATRY | Facility: CLINIC | Age: 42
End: 2022-03-14
Payer: MEDICARE

## 2022-03-14 VITALS
SYSTOLIC BLOOD PRESSURE: 125 MMHG | BODY MASS INDEX: 33.29 KG/M2 | HEIGHT: 64 IN | WEIGHT: 195 LBS | DIASTOLIC BLOOD PRESSURE: 84 MMHG | HEART RATE: 109 BPM

## 2022-03-14 DIAGNOSIS — M25.872 SESAMOIDITIS OF LEFT FOOT: Primary | ICD-10-CM

## 2022-03-14 PROCEDURE — 99212 OFFICE O/P EST SF 10 MIN: CPT | Performed by: PODIATRIST

## 2022-03-14 NOTE — PROGRESS NOTES
Patient presents for assessment of left foot  Patient continues to have pain while walking at the left tibial sesamoid  Diagnosis is that of tibial sesamoiditis  She has had 3 cortisone injections and still does not have relief  Patient is avoiding walking and is not return to working quite some time  Additional injections are not desired  Applied dancer's pad to insole of her running shoe  She will be reassessed in 4 weeks  Tibial sesamoidectomy may be necessary

## 2022-04-11 ENCOUNTER — OFFICE VISIT (OUTPATIENT)
Dept: PODIATRY | Facility: CLINIC | Age: 42
End: 2022-04-11
Payer: MEDICARE

## 2022-04-11 VITALS
WEIGHT: 195 LBS | BODY MASS INDEX: 33.29 KG/M2 | HEIGHT: 64 IN | SYSTOLIC BLOOD PRESSURE: 117 MMHG | HEART RATE: 91 BPM | DIASTOLIC BLOOD PRESSURE: 82 MMHG

## 2022-04-11 DIAGNOSIS — M25.872 SESAMOIDITIS OF LEFT FOOT: Primary | ICD-10-CM

## 2022-04-11 PROCEDURE — 99212 OFFICE O/P EST SF 10 MIN: CPT | Performed by: PODIATRIST

## 2022-04-11 NOTE — LETTER
April 11, 2022     Patient: Joan Alonso   YOB: 1980   Date of Visit: 4/11/2022       To Whom it May Concern:    Anup Sandhoff is under my professional care  She was seen in my office on 4/11/2022  She may return to work on 4/18/22  If you have any questions or concerns, please don't hesitate to call           Sincerely,          Hunter Corral DPM        CC: No Recipients

## 2022-04-11 NOTE — LETTER
April 11, 2022     Patient: Haresh Aj   YOB: 1980   Date of Visit: 4/11/2022       To Whom it May Concern:    Zahraa Tolliver is under my professional care  She was seen in my office on 4/11/2022  She may return to work on 4/18/22  If you have any questions or concerns, please don't hesitate to call           Sincerely,          Sondra Cotto DPM        CC: No Recipients

## 2022-04-11 NOTE — PROGRESS NOTES
Patient presents for assessment of left foot and specifically tibial sesamoid pain  She relates minimal discomfort at this time  Patient told that she may return to work on April 18, 2022  No additional treatment needed  Myah lafleur n

## 2022-09-26 ENCOUNTER — HOSPITAL ENCOUNTER (EMERGENCY)
Facility: HOSPITAL | Age: 42
Discharge: HOME | End: 2022-09-26
Attending: EMERGENCY MEDICINE
Payer: MEDICARE

## 2022-09-26 VITALS
SYSTOLIC BLOOD PRESSURE: 134 MMHG | WEIGHT: 188.6 LBS | DIASTOLIC BLOOD PRESSURE: 90 MMHG | HEART RATE: 106 BPM | RESPIRATION RATE: 18 BRPM | OXYGEN SATURATION: 98 % | BODY MASS INDEX: 32.37 KG/M2 | TEMPERATURE: 97.4 F

## 2022-09-26 DIAGNOSIS — N39.0 UTI (URINARY TRACT INFECTION): Primary | ICD-10-CM

## 2022-09-26 LAB
BACTERIA UR QL AUTO: ABNORMAL /HPF
BILIRUB UR QL STRIP: NEGATIVE
CLARITY UR: CLEAR
COLOR UR: ABNORMAL
EXT PREG TEST URINE: NEGATIVE
EXT. CONTROL ED NAV: NORMAL
GLUCOSE UR STRIP-MCNC: NEGATIVE MG/DL
HGB UR QL STRIP.AUTO: 50
KETONES UR STRIP-MCNC: NEGATIVE MG/DL
LEUKOCYTE ESTERASE UR QL STRIP: 25
MUCOUS THREADS UR QL AUTO: ABNORMAL
NITRITE UR QL STRIP: NEGATIVE
NON-SQ EPI CELLS URNS QL MICRO: ABNORMAL /HPF
PH UR STRIP.AUTO: 6 [PH]
PROT UR STRIP-MCNC: ABNORMAL MG/DL
RBC #/AREA URNS AUTO: ABNORMAL /HPF
SP GR UR STRIP.AUTO: 1.02 (ref 1–1.04)
UROBILINOGEN UA: 1 MG/DL
WBC #/AREA URNS AUTO: ABNORMAL /HPF

## 2022-09-26 PROCEDURE — 99283 EMERGENCY DEPT VISIT LOW MDM: CPT

## 2022-09-26 PROCEDURE — 81001 URINALYSIS AUTO W/SCOPE: CPT | Performed by: EMERGENCY MEDICINE

## 2022-09-26 PROCEDURE — 99284 EMERGENCY DEPT VISIT MOD MDM: CPT | Performed by: EMERGENCY MEDICINE

## 2022-09-26 PROCEDURE — 81025 URINE PREGNANCY TEST: CPT | Performed by: EMERGENCY MEDICINE

## 2022-09-26 RX ORDER — SULFAMETHOXAZOLE AND TRIMETHOPRIM 800; 160 MG/1; MG/1
1 TABLET ORAL 2 TIMES DAILY
Qty: 10 TABLET | Refills: 0 | Status: SHIPPED | OUTPATIENT
Start: 2022-09-26 | End: 2022-10-01

## 2022-09-26 RX ORDER — SULFAMETHOXAZOLE AND TRIMETHOPRIM 800; 160 MG/1; MG/1
1 TABLET ORAL ONCE
Status: COMPLETED | OUTPATIENT
Start: 2022-09-26 | End: 2022-09-26

## 2022-09-26 RX ADMIN — SULFAMETHOXAZOLE AND TRIMETHOPRIM 1 TABLET: 800; 160 TABLET ORAL at 18:03

## 2022-09-26 NOTE — ED PROVIDER NOTES
History  Chief Complaint   Patient presents with    Possible UTI     Pain with voiding x2 days  Foul smelling urine  Lower pain pain  76-year-old female presenting to the emergency department with urinary frequency and urgency for the past 2 days  Also notes that she is noticing a change to order from her body but not vaginal discharge or vaginal smell  No fevers chills  Mild after back pain  No headache or vision changes  No dysuria hematuria  History provided by:  Patient      Prior to Admission Medications   Prescriptions Last Dose Informant Patient Reported? Taking?    Apremilast (OTEZLA PO)   Yes No   Sig: Take 40 mg by mouth 2 (two) times a day   Patient not taking: Reported on 8/16/2021   Diclofenac Sodium (VOLTAREN) 1 %   No No   Sig: Apply 2 g topically 4 (four) times a day   Humira Pen 40 MG/0 4ML PNKT   Yes No   Sig: INJECT 40MG ( 1 PEN ) UNDER THE SKIN EVERY OTHER WEEK   cyclobenzaprine (FLEXERIL) 5 mg tablet   No No   Sig: Take 1 tablet (5 mg total) by mouth 3 (three) times a day as needed for muscle spasms   Patient not taking: Reported on 8/16/2021   famotidine (PEPCID) 20 mg tablet   No No   Sig: Take 1 tablet (20 mg total) by mouth 2 (two) times a day   Patient not taking: Reported on 7/2/2019   loratadine (CLARITIN) 10 mg tablet   No No   Sig: Take 1 tablet (10 mg total) by mouth daily   Patient not taking: Reported on 8/16/2021   meloxicam (MOBIC) 15 mg tablet   No No   Sig: TAKE 1 TABLET(15 MG) BY MOUTH DAILY   methocarbamol (ROBAXIN) 750 mg tablet   No No   Sig: Take 1 tablet (750 mg total) by mouth every 6 (six) hours as needed for muscle spasms   Patient not taking: Reported on 8/16/2021   methylPREDNISolone 4 MG tablet therapy pack   No No   Sig: Use as directed on package   Patient not taking: Reported on 4/11/2022    predniSONE 50 mg tablet  Self Yes No   Sig: Take 100 mg by mouth daily   Patient not taking: Reported on 8/16/2021      Facility-Administered Medications: None Past Medical History:   Diagnosis Date    Blind left eye     Major depression 2013    Prosthetic eye globe     Pyelonephritis 2014    w/sepsis       Past Surgical History:   Procedure Laterality Date     SECTION      x3    EYE SURGERY         Family History   Problem Relation Age of Onset    Diabetes Family         Mellitus    Diabetes Mother         Mellitus     I have reviewed and agree with the history as documented  E-Cigarette/Vaping    E-Cigarette Use Never User      E-Cigarette/Vaping Substances     Social History     Tobacco Use    Smoking status: Current Every Day Smoker     Packs/day: 0 25     Years: 4 00     Pack years: 1 00     Types: Cigarettes    Smokeless tobacco: Never Used   Vaping Use    Vaping Use: Never used   Substance Use Topics    Alcohol use: Yes     Comment: occasional     Drug use: No       Review of Systems   Constitutional: Negative for chills and fever  HENT: Negative for ear pain and sore throat  Eyes: Negative for pain and visual disturbance  Respiratory: Negative for cough and shortness of breath  Cardiovascular: Negative for chest pain and palpitations  Gastrointestinal: Negative for abdominal pain and vomiting  Genitourinary: Negative for dysuria and hematuria  Musculoskeletal: Positive for back pain  Negative for arthralgias  Skin: Negative for color change and rash  Neurological: Negative for seizures and syncope  All other systems reviewed and are negative  Physical Exam  Physical Exam  Vitals and nursing note reviewed  Constitutional:       General: She is not in acute distress  Appearance: She is well-developed  HENT:      Head: Normocephalic and atraumatic  Nose: Nose normal       Mouth/Throat:      Mouth: Mucous membranes are moist    Eyes:      Conjunctiva/sclera: Conjunctivae normal    Cardiovascular:      Rate and Rhythm: Normal rate and regular rhythm  Heart sounds: No murmur heard    Pulmonary: Effort: Pulmonary effort is normal  No respiratory distress  Breath sounds: Normal breath sounds  Abdominal:      Palpations: Abdomen is soft  Tenderness: There is no abdominal tenderness  There is left CVA tenderness  There is no right CVA tenderness  Musculoskeletal:      Cervical back: Neck supple  Skin:     General: Skin is warm and dry  Capillary Refill: Capillary refill takes less than 2 seconds  Neurological:      Mental Status: She is alert and oriented to person, place, and time  Vital Signs  ED Triage Vitals [09/26/22 1731]   Temperature Pulse Respirations Blood Pressure SpO2   (!) 97 4 °F (36 3 °C) (!) 106 18 134/90 98 %      Temp Source Heart Rate Source Patient Position - Orthostatic VS BP Location FiO2 (%)   Tympanic Monitor Sitting Left arm --      Pain Score       5           Vitals:    09/26/22 1731   BP: 134/90   Pulse: (!) 106   Patient Position - Orthostatic VS: Sitting         Visual Acuity      ED Medications  Medications   sulfamethoxazole-trimethoprim (BACTRIM DS) 800-160 mg per tablet 1 tablet (has no administration in time range)       Diagnostic Studies  Results Reviewed     Procedure Component Value Units Date/Time    UA (URINE) with reflex to Scope [[id number]]  (Abnormal) Collected: 09/26/22 1743    Lab Status: Final result Specimen: Urine, Clean Catch Updated: 09/26/22 1754     Color, UA Diane     Clarity, UA Clear     Specific [place], UA 1 020     pH, UA 6 0     Leukocytes, UA 25 0     Nitrite, UA Negative     Protein, UA 15 (Trace) mg/dl      Glucose, UA Negative mg/dl      Ketones, UA Negative mg/dl      Bilirubin, UA Negative     Occult Blood, UA 50 0     UROBILINOGEN UA 1 0 mg/dL     Urine Microscopic [[id number]] Collected: 09/26/22 1743    Lab Status:  In process Specimen: Urine, Clean Catch Updated: 09/26/22 1753    POCT pregnancy, urine [[id number]]  (Normal) Resulted: 09/26/22 1745    Lab Status: Final result Updated: 09/26/22 1745     EXT PREG TEST UR (Ref: Negative) negative     Control valid                 No orders to display              Procedures  Procedures         ED Course                                             MDM  Number of Diagnoses or Management Options  UTI (urinary tract infection)  Diagnosis management comments: UA consistent with UTi will treat with bactrim, PCP follow up  Disposition  Final diagnoses:   UTI (urinary tract infection)     Time reflects when diagnosis was documented in both MDM as applicable and the Disposition within this note     Time User Action Codes Description Comment    9/26/2022  5:59 PM Shay Clarkeelor Add [N39 0] UTI (urinary tract infection)       ED Disposition     ED Disposition   Discharge    Condition   Stable    Date/Time   Mon Sep 26, 2022  5:59 PM    [address] discharge to home/self care  Follow-up Information     Follow up With Specialties Details Why [address] [facility] 2nd Floor Family Medicine In 7 days  [address] [place] [address]  [phone]            Patient's Medications   Discharge Prescriptions    SULFAMETHOXAZOLE-TRIMETHOPRIM (BACTRIM DS) 800-160 MG PER TABLET    Take 1 tablet by mouth 2 (two) times a day for 5 days smx-tmp DS (BACTRIM) 800-160 mg tabs (1tab q12 D10)       Start Date: 9/26/2022 End Date: 10/1/2022       Order Dose: 1 tablet       Quantity: 10 tablet    Refills: 0       No discharge procedures on file      PDMP Review       Value Time User    PDMP Reviewed  Yes 10/16/2020  4:36 PM Jayda Church PA-C          ED Provider  Electronically Signed by           Rosa M Blancas MD  09/26/22 0541

## 2023-07-21 ENCOUNTER — HOSPITAL ENCOUNTER (EMERGENCY)
Facility: HOSPITAL | Age: 43
Discharge: HOME | End: 2023-07-21
Attending: EMERGENCY MEDICINE
Payer: COMMERCIAL

## 2023-07-21 VITALS
HEART RATE: 103 BPM | RESPIRATION RATE: 18 BRPM | DIASTOLIC BLOOD PRESSURE: 86 MMHG | SYSTOLIC BLOOD PRESSURE: 120 MMHG | OXYGEN SATURATION: 92 % | TEMPERATURE: 98.7 F | WEIGHT: 199.96 LBS | BODY MASS INDEX: 34.32 KG/M2

## 2023-07-21 DIAGNOSIS — L71.9 ROSACEA: Primary | ICD-10-CM

## 2023-07-21 DIAGNOSIS — L40.0 PSORIASIS VULGARIS: ICD-10-CM

## 2023-07-21 PROCEDURE — 99283 EMERGENCY DEPT VISIT LOW MDM: CPT

## 2023-07-21 PROCEDURE — 99284 EMERGENCY DEPT VISIT MOD MDM: CPT

## 2023-07-21 RX ORDER — PREDNISONE 20 MG/1
20 TABLET ORAL DAILY
Qty: 5 TABLET | Refills: 0 | Status: SHIPPED | OUTPATIENT
Start: 2023-07-21 | End: 2023-07-26

## 2023-07-21 RX ORDER — DOXYCYCLINE HYCLATE 100 MG/1
100 CAPSULE ORAL ONCE
Status: COMPLETED | OUTPATIENT
Start: 2023-07-21 | End: 2023-07-21

## 2023-07-21 RX ORDER — DOXYCYCLINE 40 MG/1
40 CAPSULE ORAL EVERY MORNING
Qty: 10 CAPSULE | Refills: 0 | Status: SHIPPED | OUTPATIENT
Start: 2023-07-21 | End: 2023-07-31

## 2023-07-21 RX ORDER — PREDNISONE 20 MG/1
20 TABLET ORAL ONCE
Status: COMPLETED | OUTPATIENT
Start: 2023-07-21 | End: 2023-07-21

## 2023-07-21 RX ADMIN — DOXYCYCLINE 100 MG: 100 CAPSULE ORAL at 21:52

## 2023-07-21 RX ADMIN — PREDNISONE 20 MG: 20 TABLET ORAL at 21:52

## 2023-07-25 NOTE — ED PROVIDER NOTES
History  Chief Complaint   Patient presents with   • Rash     Pt reports that she has a worsening rash on her face. Pt saw her dermatologist and was given Calcipotrilene, Betamethasone, and Clobetasol propionate. Pt also started using new Cetaphil soaps recently. Pt reports burning pain and it has worsened the last couple days. 39year old female presenting today with concerns of worsening rash on her face. Patient does have a history of rosacea as well as eczema and she is unsure which one is acting up but states that it is very itchy and can be painful to the touch. States that she has been drinking alcohol and unsure if this been worsening due to this. Patient does state that she has a follow-up with her dermatologist soon but was concerned that the topical medications were not working. Patient has tried other home remedies but they have not been beneficial.  Denies any fever, chills, nausea, vomiting, dysphagia, belly pain, diarrhea, constipation, chest pain, shortness of breath, or any other symptoms at this time. Prior to Admission Medications   Prescriptions Last Dose Informant Patient Reported? Taking?    Apremilast (OTEZLA PO)   Yes No   Sig: Take 40 mg by mouth 2 (two) times a day   Patient not taking: Reported on 8/16/2021   Diclofenac Sodium (VOLTAREN) 1 %   No No   Sig: Apply 2 g topically 4 (four) times a day   Humira Pen 40 MG/0.4ML PNKT   Yes No   Sig: INJECT 40MG ( 1 PEN ) UNDER THE SKIN EVERY OTHER WEEK   cyclobenzaprine (FLEXERIL) 5 mg tablet   No No   Sig: Take 1 tablet (5 mg total) by mouth 3 (three) times a day as needed for muscle spasms   Patient not taking: Reported on 8/16/2021   famotidine (PEPCID) 20 mg tablet   No No   Sig: Take 1 tablet (20 mg total) by mouth 2 (two) times a day   Patient not taking: Reported on 7/2/2019   loratadine (CLARITIN) 10 mg tablet   No No   Sig: Take 1 tablet (10 mg total) by mouth daily   Patient not taking: Reported on 8/16/2021   meloxicam (MOBIC) 15 mg tablet   No No   Sig: TAKE 1 TABLET(15 MG) BY MOUTH DAILY   methocarbamol (ROBAXIN) 750 mg tablet   No No   Sig: Take 1 tablet (750 mg total) by mouth every 6 (six) hours as needed for muscle spasms   Patient not taking: Reported on 2021   methylPREDNISolone 4 MG tablet therapy pack   No No   Sig: Use as directed on package   Patient not taking: Reported on 2022    predniSONE 50 mg tablet  Self Yes No   Sig: Take 100 mg by mouth daily   Patient not taking: Reported on 2021      Facility-Administered Medications: None       Past Medical History:   Diagnosis Date   • Blind left eye    • Major depression    • Prosthetic eye globe    • Pyelonephritis     w/sepsis       Past Surgical History:   Procedure Laterality Date   •  SECTION      x3   • EYE SURGERY         Family History   Problem Relation Age of Onset   • Diabetes Family         Mellitus   • Diabetes Mother         Mellitus     I have reviewed and agree with the history as documented. E-Cigarette/Vaping   • E-Cigarette Use Never User      E-Cigarette/Vaping Substances     Social History     Tobacco Use   • Smoking status: Every Day     Packs/day: 0.25     Years: 4.00     Total pack years: 1.00     Types: Cigarettes   • Smokeless tobacco: Never   Vaping Use   • Vaping Use: Never used   Substance Use Topics   • Alcohol use: Yes     Comment: occasional    • Drug use: No       Review of Systems   Constitutional: Negative for chills and fever. HENT: Negative for ear pain and sore throat. Eyes: Negative for pain and visual disturbance. Respiratory: Negative for cough and shortness of breath. Cardiovascular: Negative for chest pain and palpitations. Gastrointestinal: Negative for abdominal pain and vomiting. Genitourinary: Negative for dysuria and hematuria. Musculoskeletal: Negative for arthralgias and back pain. Skin: Positive for rash. Negative for color change.    Neurological: Negative for seizures and syncope. All other systems reviewed and are negative. Physical Exam  Physical Exam  Vitals and nursing note reviewed. Constitutional:       General: She is not in acute distress. Appearance: She is well-developed. HENT:      Head: Normocephalic and atraumatic. Eyes:      Conjunctiva/sclera: Conjunctivae normal.   Cardiovascular:      Rate and Rhythm: Normal rate and regular rhythm. Heart sounds: No murmur heard. Pulmonary:      Effort: Pulmonary effort is normal. No respiratory distress. Breath sounds: Normal breath sounds. Abdominal:      Palpations: Abdomen is soft. Tenderness: There is no abdominal tenderness. Musculoskeletal:         General: No swelling. Cervical back: Neck supple. Skin:     General: Skin is warm and dry. Capillary Refill: Capillary refill takes less than 2 seconds. Coloration: Skin is not jaundiced or pale. Findings: Rash (Maculopapular rash to the bilateral cheeks and on patient's hands.) present. No bruising, erythema or lesion. Neurological:      Mental Status: She is alert.    Psychiatric:         Mood and Affect: Mood normal.         Vital Signs  ED Triage Vitals [07/21/23 1948]   Temperature Pulse Respirations Blood Pressure SpO2   98.7 °F (37.1 °C) 103 18 120/86 92 %      Temp Source Heart Rate Source Patient Position - Orthostatic VS BP Location FiO2 (%)   Oral Monitor Sitting Left arm --      Pain Score       --           Vitals:    07/21/23 1948   BP: 120/86   Pulse: 103   Patient Position - Orthostatic VS: Sitting         Visual Acuity      ED Medications  Medications   predniSONE tablet 20 mg (20 mg Oral Given 7/21/23 2152)   doxycycline hyclate (VIBRAMYCIN) capsule 100 mg (100 mg Oral Given 7/21/23 2152)       Diagnostic Studies  Results Reviewed     None                 No orders to display              Procedures  Procedures         ED Course                                             Medical Decision Making  62-year-old female presenting today with concerns of rosacea versus eczema flareup. Unresponsive to topical therapies. We will the patient follow-up with dermatology for further evaluation we will start patient on oral medication for rosacea as well as for eczema. Patient agreeable to plan, patient at time of discharge well-appearing no acute distress, questions answered. Patient's vitals, lab/imaging results, diagnosis, and treatment plan were discussed with the patient. All new/changed medications were discussed with patient, specifically, route of administration, how often and when to take, and where they can be picked up. Strict return precautions as well as close follow up with PCP was discussed with the patient and the patient was agreeable to my recommendations. Patient verbally acknowledged understanding of the above communications. All labs reviewed and utilized in the medical decision making process (if labs were ordered). Portions of the record may have been created with voice recognition software.  Occasional wrong word or "sound a like" substitutions may have occurred due to the inherent limitations of voice recognition software.  Read the chart carefully and recognize, using context, where substitutions have occurred. Risk  Prescription drug management. Disposition  Final diagnoses:   Rosacea   Psoriasis vulgaris     Time reflects when diagnosis was documented in both MDM as applicable and the Disposition within this note     Time User Action Codes Description Comment    7/21/2023  9:42 PM Dayanara Saavedra Add [L71.9] Rosacea     7/21/2023  9:42 PM Dayanara Saavedra Add [L40.0] Psoriasis vulgaris       ED Disposition     ED Disposition   Discharge    Condition   Stable    Date/Time   Fri Jul 21, 2023  9:42 PM    [address] discharge to home/self care.                Follow-up Information     Follow up With Specialties Details Why Contact Info Additional [address] [facility] Emergency Department Emergency Medicine Go to  If symptoms worsen [address]  [id number]  [facility] Emergency Department          Discharge Medication List as of 7/21/2023  9:51 PM      START taking these medications    Details   doxycycline (ORACEA) 40 MG capsule Take 1 capsule (40 mg total) by mouth every morning for 10 days, Starting Fri 7/21/2023, Until Mon 7/31/2023, Normal      !! predniSONE 20 mg tablet Take 1 tablet (20 mg total) by mouth daily for 5 days, Starting Fri 7/21/2023, Until Wed 7/26/2023, Normal       !! - Potential duplicate medications found. Please discuss with provider. CONTINUE these medications which have NOT CHANGED    Details   Apremilast (OTEZLA PO) Take 40 mg by mouth 2 (two) times a day, Historical Med      cyclobenzaprine (FLEXERIL) 5 mg tablet Take 1 tablet (5 mg total) by mouth 3 (three) times a day as needed for muscle spasms, Starting Mon 10/12/2020, Phone In      Diclofenac Sodium (VOLTAREN) 1 % Apply 2 g topically 4 (four) times a day, Starting Tue 11/16/2021, Normal      famotidine (PEPCID) 20 mg tablet Take 1 tablet (20 mg total) by mouth 2 (two) times a day, Starting Mon 6/24/2019, Print      Humira Pen 40 MG/0.4ML PNKT INJECT 40MG ( 1 PEN ) UNDER THE SKIN EVERY OTHER WEEK, Historical Med      loratadine (CLARITIN) 10 mg tablet Take 1 tablet (10 mg total) by mouth daily, Starting Tue 6/29/2021, Normal      meloxicam (MOBIC) 15 mg tablet TAKE 1 TABLET(15 MG) BY MOUTH DAILY, Normal      methocarbamol (ROBAXIN) 750 mg tablet Take 1 tablet (750 mg total) by mouth every 6 (six) hours as needed for muscle spasms, Starting Fri 10/16/2020, Normal      methylPREDNISolone 4 MG tablet therapy pack Use as directed on package, Normal      !! predniSONE 50 mg tablet Take 100 mg by mouth daily, Historical Med       !! - Potential duplicate medications found.  Please discuss with provider. No discharge procedures on file.     PDMP Review       Value Time User    PDMP Reviewed  Yes 10/16/2020  4:36 PM [address] PA-C          ED Provider  Electronically Signed by           Angela Klein PA-C  07/25/23 0413

## 2023-08-11 ENCOUNTER — TELEPHONE (OUTPATIENT)
Dept: FAMILY MEDICINE CLINIC | Facility: CLINIC | Age: 43
End: 2023-08-11

## 2023-08-11 NOTE — TELEPHONE ENCOUNTER
Hi, good morning. My name is Shruti Geller. I have an appointment today at 3:20 and I would like to reschedule maybe for next week. Can someone call me back at 40, [phone]? Again? My name is Shruti Geller. My YOB: 1980. I had an appointment today for 2:20 and I would like to reschedule. My number is [phone] to three. Thank you.       Patient is scheduled 8/16

## 2023-08-11 NOTE — PROGRESS NOTES
Name: Shraddha Warner      : 1980      MRN: [id number]  Encounter Provider: [address], [place]  Encounter Date: 2023   Encounter department: [address] [facility],[address] Floor     1. Obesity (BMI 30.0-34. 9)  Assessment & Plan:  · Reports struggling to maintain her normal weight since adolescence   · Has tried diet and exercise before without improvement  · States that now she is formally exercising, but is eating small portions of healthy meals  · Patient is motivated to start a weight management program    Plan:  · Referral for weight management was sent   · Education about lifestyle modification were given    Orders:  -     Ambulatory Referral to Weight Management; Future    BMI Counseling: Body mass index is 34.67 kg/m². The BMI is above normal. Nutrition recommendations include decreasing portion sizes and decreasing fast food intake. Exercise recommendations include vigorous physical activity 75 minutes/week. Rationale for BMI follow-up plan is due to patient being overweight or obese. Depression Screening and Follow-up Plan: Patient was screened for depression during today's encounter. They screened negative with a PHQ-2 score of 2. Deborah Hugo is a 39 y.o female with PMH of acne and tobacco dependence who presents today to request weight management referral.    Review of Systems   Constitutional: Negative for chills and fever. HENT: Negative for ear pain and sore throat. Eyes: Negative for pain and visual disturbance. Respiratory: Negative for cough and shortness of breath. Cardiovascular: Negative for chest pain and palpitations. Gastrointestinal: Negative for abdominal pain and vomiting. Genitourinary: Negative for dysuria and hematuria. Musculoskeletal: Negative for arthralgias. Skin: Negative for rash. Neurological: Negative for seizures and syncope. All other systems reviewed and are negative.       Current Outpatient Medications on File Prior to Visit   Medication Sig   • Apremilast (OTEZLA PO) Take 40 mg by mouth 2 (two) times a day (Patient not taking: Reported on 8/16/2021)   • cyclobenzaprine (FLEXERIL) 5 mg tablet Take 1 tablet (5 mg total) by mouth 3 (three) times a day as needed for muscle spasms (Patient not taking: Reported on 8/16/2021)   • Diclofenac Sodium (VOLTAREN) 1 % Apply 2 g topically 4 (four) times a day   • doxycycline (ORACEA) 40 MG capsule Take 1 capsule (40 mg total) by mouth every morning for 10 days   • famotidine (PEPCID) 20 mg tablet Take 1 tablet (20 mg total) by mouth 2 (two) times a day (Patient not taking: Reported on 7/2/2019)   • Humira Pen 40 MG/0.4ML PNKT INJECT 40MG ( 1 PEN ) UNDER THE SKIN EVERY OTHER WEEK   • loratadine (CLARITIN) 10 mg tablet Take 1 tablet (10 mg total) by mouth daily (Patient not taking: Reported on 8/16/2021)   • meloxicam (MOBIC) 15 mg tablet TAKE 1 TABLET(15 MG) BY MOUTH DAILY   • methocarbamol (ROBAXIN) 750 mg tablet Take 1 tablet (750 mg total) by mouth every 6 (six) hours as needed for muscle spasms (Patient not taking: Reported on 8/16/2021)   • methylPREDNISolone 4 MG tablet therapy pack Use as directed on package (Patient not taking: Reported on 4/11/2022 )   • predniSONE 50 mg tablet Take 100 mg by mouth daily (Patient not taking: Reported on 8/16/2021)       Objective     /76 (BP Location: Right arm, Patient Position: Sitting, Cuff Size: Standard)   Pulse (!) 107   Temp 98.9 °F (37.2 °C) (Temporal)   Resp 16   Ht 5' 4" (1.626 m)   Wt 91.6 kg (202 lb)   LMP  (Within Months)   SpO2 96%   BMI 34.67 kg/m²     Physical Exam  Constitutional:       General: She is not in acute distress. Appearance: Normal appearance. She is obese. She is not toxic-appearing. HENT:      Head: Normocephalic and atraumatic. Nose: Nose normal. No congestion or rhinorrhea. Eyes:      General: No scleral icterus.      Conjunctiva/sclera: Conjunctivae normal.   Cardiovascular:      Rate and Rhythm: Normal rate and regular rhythm. Pulses: Normal pulses. Heart sounds: Normal heart sounds. No murmur heard. No gallop. Pulmonary:      Effort: Pulmonary effort is normal. No respiratory distress. Breath sounds: Normal breath sounds. No wheezing or rales. Musculoskeletal:      Cervical back: Normal range of motion and neck supple. Right lower leg: No edema. Left lower leg: No edema. Skin:     General: Skin is warm and dry. Capillary Refill: Capillary refill takes less than 2 seconds. Coloration: Skin is not jaundiced or pale. Neurological:      General: No focal deficit present. Mental Status: She is alert and oriented to person, place, and time.    Psychiatric:         Mood and Affect: Mood normal.         Behavior: Behavior normal.       Gissel Hansen MD

## 2023-08-16 ENCOUNTER — OFFICE VISIT (OUTPATIENT)
Dept: FAMILY MEDICINE CLINIC | Facility: CLINIC | Age: 43
End: 2023-08-16

## 2023-08-16 VITALS
WEIGHT: 202 LBS | OXYGEN SATURATION: 96 % | RESPIRATION RATE: 16 BRPM | DIASTOLIC BLOOD PRESSURE: 76 MMHG | TEMPERATURE: 98.9 F | SYSTOLIC BLOOD PRESSURE: 110 MMHG | BODY MASS INDEX: 34.49 KG/M2 | HEART RATE: 107 BPM | HEIGHT: 64 IN

## 2023-08-16 DIAGNOSIS — E66.9 OBESITY (BMI 30.0-34.9): Primary | ICD-10-CM

## 2023-08-16 PROBLEM — E66.811 OBESITY (BMI 30.0-34.9): Status: ACTIVE | Noted: 2023-08-16

## 2023-08-16 PROCEDURE — 99213 OFFICE O/P EST LOW 20 MIN: CPT | Performed by: FAMILY MEDICINE

## 2023-08-17 NOTE — ASSESSMENT & PLAN NOTE
· Reports struggling to maintain her normal weight since adolescence   · Has tried diet and exercise before without improvement  · States that now she is formally exercising, but is eating small portions of healthy meals  · Patient is motivated to start a weight management program    Plan:  · Referral for weight management was sent   · Education about lifestyle modification were given

## 2024-01-08 ENCOUNTER — RA CDI HCC (OUTPATIENT)
Dept: OTHER | Facility: HOSPITAL | Age: 44
End: 2024-01-08

## 2024-01-08 NOTE — PROGRESS NOTES
F32.5  HCC coding opportunities          Chart Reviewed number of suggestions sent to Provider: 1     Patients Insurance     Medicare Insurance: Medicare

## 2024-05-24 ENCOUNTER — TELEPHONE (OUTPATIENT)
Dept: BARIATRICS | Facility: CLINIC | Age: 44
End: 2024-05-24

## 2024-06-06 ENCOUNTER — TELEPHONE (OUTPATIENT)
Age: 44
End: 2024-06-06

## 2024-06-06 NOTE — TELEPHONE ENCOUNTER
Pt said someone called her but didn't leave a message.  I told her I see nothing in her chart showing anyone calling.  She wanted to know if there was any openings sooner, but nothing at this time

## 2024-06-20 NOTE — ASSESSMENT & PLAN NOTE
Noted to forefoot secondary to blunt trauma  Improving but pain persists  Advised to continue analgesics - ibuprofen 400mg TID +/- Tylenol 1g po TID PRN  Rest and ICE as currently practicing  Non-weight bearing x 1 week and work leave note given for 1 week  Quality 130: Documentation Of Current Medications In The Medical Record: Current Medications Documented Quality 111:Pneumonia Vaccination Status For Older Adults: Pneumococcal vaccine (PPSV23) was not administered on or after patient’s 60th birthday and before the end of the measurement period, reason not otherwise specified Quality 226: Preventive Care And Screening: Tobacco Use: Screening And Cessation Intervention: Patient screened for tobacco use and is an ex/non-smoker Quality 431: Preventive Care And Screening: Unhealthy Alcohol Use - Screening: Patient not identified as an unhealthy alcohol user when screened for unhealthy alcohol use using a systematic screening method Quality 110: Preventive Care And Screening: Influenza Immunization: Influenza Immunization not Administered because Patient Refused. Detail Level: Detailed

## 2024-10-01 ENCOUNTER — OFFICE VISIT (OUTPATIENT)
Dept: BARIATRICS | Facility: CLINIC | Age: 44
End: 2024-10-01
Payer: COMMERCIAL

## 2024-10-01 VITALS
SYSTOLIC BLOOD PRESSURE: 110 MMHG | HEART RATE: 92 BPM | DIASTOLIC BLOOD PRESSURE: 60 MMHG | WEIGHT: 201.8 LBS | BODY MASS INDEX: 37.13 KG/M2 | HEIGHT: 62 IN | TEMPERATURE: 96.9 F | RESPIRATION RATE: 16 BRPM

## 2024-10-01 DIAGNOSIS — E66.811 OBESITY (BMI 30.0-34.9): Primary | ICD-10-CM

## 2024-10-01 PROCEDURE — G2211 COMPLEX E/M VISIT ADD ON: HCPCS | Performed by: PHYSICIAN ASSISTANT

## 2024-10-01 PROCEDURE — 99203 OFFICE O/P NEW LOW 30 MIN: CPT | Performed by: PHYSICIAN ASSISTANT

## 2024-10-01 NOTE — PROGRESS NOTES
Assessment/Plan:    Obesity (BMI 30.0-34.9)  -Discussed options of Healthy[facility]-Intensive Lifestyle Intervention Program, Very Low Calorie Diet-VLCD, and Conservative Program and the role of weight loss medications.Explained the importance of making lifestyle changes if utilizing medication to aid in weight loss  -Initial weight loss goal of 5-10% weight loss for improved health  -Screening labs and records reviewed from prior  - STOP BANG-    -Patient is interested in pursuing Conservative Program  -Calorie goals, sample menu (8276-1590 calories), portion size guidelines, and food logging reviewed with the patient.      Goals:  -Food log (ie.) www.[employer]-1100  - To drink at least 64oz of water daily.No sugary beverages.cut out/reduce soda and alcohol intake  -discussed trying to eat consistently and avoid skipping meals    To start on wegovy . They have tried more than 6 months of lifestyle modifications including diet and activity changes and has had insignificant weight loss of less than 1 lb a week. Patient denies personal and family history of MCT and MEN2 tumors. Patient denies personal history of pancreatitis. Side effects discussed but not limited to diarrhea, bloating, constipation, GI upset, heartburn, increased heart rate, headache, low blood sugar, fatigue and dizziness. Titration and medication administration discussed. Hand out given. Discussed dietary changes necessary  Past medications: phentermine-vomiting, topamax-tingling  Medication agreement signed 10/1/24      Initial Weight:201.8  Goal Weight:150        Return in about 6 years (around 10/1/2030) for  2/4 month nurse visit.      Diagnoses and all orders for this visit:    Obesity (BMI 30.0-34.9)  -     Semaglutide-Weight Management (WEGOVY) 0.25 MG/0.5ML; Inject 0.5 mL (0.25 mg total) under the skin once a week    Other orders  -     Risankizumab-rzaa (SKYRIZI IV); Inject into a catheter in a vein           Subjective:   Chief Complaint   Patient presents with    Consult     MWM- Consult; GW-150lb; Waist 42.5in- Stop Bang        Patient ID: Dolly Painting  is a 44 y.o. female with excess weight/obesity here to pursue weight management.    Past Medical History:   Diagnosis Date    Blind left eye     Major depression     Prosthetic eye globe     Pyelonephritis     w/sepsis       HPI: Here for MWM consult  She was seeing Dr. Minaya and on weight loss medication. She was having vomiting and was having little appetite.  She was on topamax and having numbness She did gain weight back after stopping.      She does see dermatology for psoriasis    She was on a lot of psych meds before-xanax and seroquel and stopped it.  She gained 60 lb from that. Currently unsure why not losing weight as not eating much and trying to make clean eating choices    Obesity/Excess Weight:  Severity:  class II  Onset:  a few years.      Modifiers: Diet and Exercise, Physician Supervised Weight Loss Program, and Prescription Weight Loss Medications  Contributing factors: Medications    Hydration: 8 bottles of water.  1 soda a day(cut back from 2)  Alcohol: 1-2 daysa week.  Sunni about 20 drinks  Exercise:  Occupation:culinary tech at [employer]    The following portions of the patient's history were reviewed and updated as appropriate: She  has a past medical history of Blind left eye, Major depression (), Prosthetic eye globe, and Pyelonephritis ().  She   Patient Active Problem List    Diagnosis Date Noted    Obesity (BMI 30.0-34.9) 2023    Contusion of left foot 2021    Adult acne 2018    Tobacco abuse counseling 2018    Skin rash 2018    Psoriasis vulgaris 2015     She  has a past surgical history that includes Eye surgery and  section.  Her family history includes Diabetes in her family and mother.  She  reports that she has been smoking cigarettes. She has a 1 pack-year  smoking history. She has been exposed to tobacco smoke. She has never used smokeless tobacco. She reports current alcohol use. She reports that she does not use drugs.  Current Outpatient Medications   Medication Sig Dispense Refill    Risankizumab-rzaa (SKYRIZI IV) Inject into a catheter in a vein      Semaglutide-Weight Management (WEGOVY) 0.25 MG/0.5ML Inject 0.5 mL (0.25 mg total) under the skin once a week 2 mL 0    cyclobenzaprine (FLEXERIL) 5 mg tablet Take 1 tablet (5 mg total) by mouth 3 (three) times a day as needed for muscle spasms (Patient not taking: Reported on 8/16/2021) 5 tablet 0    Diclofenac Sodium (VOLTAREN) 1 % Apply 2 g topically 4 (four) times a day (Patient not taking: Reported on 10/1/2024) 50 g 1    doxycycline (ORACEA) 40 MG capsule Take 1 capsule (40 mg total) by mouth every morning for 10 days 10 capsule 0    famotidine (PEPCID) 20 mg tablet Take 1 tablet (20 mg total) by mouth 2 (two) times a day (Patient not taking: Reported on 7/2/2019) 30 tablet 0    loratadine (CLARITIN) 10 mg tablet Take 1 tablet (10 mg total) by mouth daily (Patient not taking: Reported on 8/16/2021) 30 tablet 0    meloxicam (MOBIC) 15 mg tablet TAKE 1 TABLET(15 MG) BY MOUTH DAILY (Patient not taking: Reported on 10/1/2024) 90 tablet 0    methocarbamol (ROBAXIN) 750 mg tablet Take 1 tablet (750 mg total) by mouth every 6 (six) hours as needed for muscle spasms (Patient not taking: Reported on 8/16/2021) 20 tablet 0    methylPREDNISolone 4 MG tablet therapy pack Use as directed on package (Patient not taking: Reported on 4/11/2022) 21 each 0    predniSONE 50 mg tablet Take 100 mg by mouth daily (Patient not taking: Reported on 8/16/2021)       No current facility-administered medications for this visit.     Current Outpatient Medications on File Prior to Visit   Medication Sig    Risankizumab-rzaa (SKYRIZI IV) Inject into a catheter in a vein    cyclobenzaprine (FLEXERIL) 5 mg tablet Take 1 tablet (5 mg total) by  mouth 3 (three) times a day as needed for muscle spasms (Patient not taking: Reported on 8/16/2021)    Diclofenac Sodium (VOLTAREN) 1 % Apply 2 g topically 4 (four) times a day (Patient not taking: Reported on 10/1/2024)    doxycycline (ORACEA) 40 MG capsule Take 1 capsule (40 mg total) by mouth every morning for 10 days    famotidine (PEPCID) 20 mg tablet Take 1 tablet (20 mg total) by mouth 2 (two) times a day (Patient not taking: Reported on 7/2/2019)    loratadine (CLARITIN) 10 mg tablet Take 1 tablet (10 mg total) by mouth daily (Patient not taking: Reported on 8/16/2021)    meloxicam (MOBIC) 15 mg tablet TAKE 1 TABLET(15 MG) BY MOUTH DAILY (Patient not taking: Reported on 10/1/2024)    methocarbamol (ROBAXIN) 750 mg tablet Take 1 tablet (750 mg total) by mouth every 6 (six) hours as needed for muscle spasms (Patient not taking: Reported on 8/16/2021)    methylPREDNISolone 4 MG tablet therapy pack Use as directed on package (Patient not taking: Reported on 4/11/2022)    predniSONE 50 mg tablet Take 100 mg by mouth daily (Patient not taking: Reported on 8/16/2021)    [DISCONTINUED] Apremilast (OTEZLA PO) Take 40 mg by mouth 2 (two) times a day (Patient not taking: Reported on 8/16/2021)    [DISCONTINUED] Humira Pen 40 MG/0.4ML PNKT INJECT 40MG ( 1 PEN ) UNDER THE SKIN EVERY OTHER WEEK (Patient not taking: Reported on 10/1/2024)     No current facility-administered medications on file prior to visit.     She is allergic to morphine sulfate, amoxicillin, clavulanic acid, fentanyl, lurasidone, and morphine and codeine..    Review of Systems   Constitutional:  Negative for fever.   Respiratory:  Negative for shortness of breath.    Cardiovascular:  Negative for chest pain and palpitations.   Gastrointestinal:  Negative for abdominal pain, constipation, diarrhea and vomiting.   Genitourinary:  Negative for difficulty urinating.   Skin:  Negative for rash.   Neurological:  Negative for headaches.  "  Psychiatric/Behavioral:  Negative for dysphoric mood. The patient is not nervous/anxious.        Objective:    /60 (BP Location: Left arm, Patient Position: Sitting)   Pulse 92   Temp (!) 96.9 °F (36.1 °C) (Tympanic)   Resp 16   Ht 5' 2\" (1.575 m)   Wt 91.5 kg (201 lb 12.8 oz)   BMI 36.91 kg/m²     Physical Exam  Vitals and nursing note reviewed.   Constitutional:       General: She is not in acute distress.     Appearance: She is well-developed. She is obese.   HENT:      Head: Normocephalic and atraumatic.   Eyes:      Conjunctiva/sclera: Conjunctivae normal.   Neck:      Thyroid: No thyromegaly.   Pulmonary:      Effort: Pulmonary effort is normal. No respiratory distress.   Skin:     Findings: No rash (visible).   Neurological:      Mental Status: She is alert and oriented to person, place, and time.   Psychiatric:         Mood and Affect: Mood normal.         Behavior: Behavior normal.         "

## 2024-10-01 NOTE — ASSESSMENT & PLAN NOTE
-Discussed options of HealthyCORE-Intensive Lifestyle Intervention Program, Very Low Calorie Diet-VLCD, and Conservative Program and the role of weight loss medications.Explained the importance of making lifestyle changes if utilizing medication to aid in weight loss  -Initial weight loss goal of 5-10% weight loss for improved health  -Screening labs and records reviewed from prior  - STOP BANG-    -Patient is interested in pursuing Conservative Program  -Calorie goals, sample menu (8565-2784 calories), portion size guidelines, and food logging reviewed with the patient.      Goals:  -Food log (ie.) www.[employer]),[employer]-1100  - To drink at least 64oz of water daily.No sugary beverages.cut out/reduce soda and alcohol intake  -discussed trying to eat consistently and avoid skipping meals    To start on wegovy . They have tried more than 6 months of lifestyle modifications including diet and activity changes and has had insignificant weight loss of less than 1 lb a week. Patient denies personal and family history of MCT and MEN2 tumors. Patient denies personal history of pancreatitis. Side effects discussed but not limited to diarrhea, bloating, constipation, GI upset, heartburn, increased heart rate, headache, low blood sugar, fatigue and dizziness. Titration and medication administration discussed. Hand out given. Discussed dietary changes necessary  Past medications: phentermine-vomiting, topamax-tingling  Medication agreement signed 10/1/24      Initial Weight:201.8  Goal Weight:150

## 2024-10-04 ENCOUNTER — TELEPHONE (OUTPATIENT)
Dept: BARIATRICS | Facility: CLINIC | Age: 44
End: 2024-10-04

## 2024-10-04 NOTE — TELEPHONE ENCOUNTER
PA for Wegovy 0.25 mg SUBMITTED     via    [x]CMM-KEY: [id number] Secondary insurance  []Surescripts-Case ID #   []Availity-Auth ID # NDC #   []Faxed to plan   []Other website   []Phone call Case ID #     Office notes sent, clinical questions answered. Awaiting determination    Turnaround time for your insurance to make a decision on your Prior Authorization can take 7-21 business days.

## 2024-10-04 NOTE — TELEPHONE ENCOUNTER
PA for Wegovy  DENIED- Primary ins( medicare)    Reason:(Screenshot if applicable) Letter scanned into  media         Message sent to office clinical pool No      Denial letter scanned into Media Yes    Appeal started No (Provider will need to decide if appeal is warranted and send clinical documentation to Prior Authorization Team for initiation.)    **Please follow up with your patient regarding denial and next steps** Wegovy

## 2024-10-04 NOTE — TELEPHONE ENCOUNTER
PA for Wegovy SUBMITTED     via    [x]CMM-KEY: [id number]  []Surescripts-Case ID #    []Availity-Auth ID #  NDC #    []Faxed to plan   []Other website    []Phone call Case ID #      Office notes sent, clinical questions answered. Awaiting determination    Turnaround time for your insurance to make a decision on your Prior Authorization can take 7-21 business days.

## 2024-10-08 NOTE — TELEPHONE ENCOUNTER
PA for Wegovy 0.25 mg  APPROVED-SECONDARY INS    Date(s) approved 10/4/2024-4/2/2025    Case #    Patient advised by          []MyChart Message  []Phone call   [x]LMOM  []L/M to call office as no active Communication consent on file  []Unable to leave detailed message as VM not approved on Communication consent       Pharmacy advised by    [x]Fax  []Phone call    Approval letter scanned into Media Yes

## 2024-10-09 NOTE — TELEPHONE ENCOUNTER
Patient called regarding her prescription for Wegovy. Warm transferred to Zaida for further assistance.

## 2024-10-10 ENCOUNTER — HOSPITAL ENCOUNTER (EMERGENCY)
Facility: HOSPITAL | Age: 44
Discharge: HOME | End: 2024-10-10
Attending: INTERNAL MEDICINE
Payer: COMMERCIAL

## 2024-10-10 VITALS
TEMPERATURE: 97.9 F | OXYGEN SATURATION: 97 % | DIASTOLIC BLOOD PRESSURE: 87 MMHG | BODY MASS INDEX: 37.5 KG/M2 | WEIGHT: 205.03 LBS | SYSTOLIC BLOOD PRESSURE: 119 MMHG | RESPIRATION RATE: 18 BRPM | HEART RATE: 95 BPM

## 2024-10-10 DIAGNOSIS — M54.50 ACUTE LOW BACK PAIN: Primary | ICD-10-CM

## 2024-10-10 PROCEDURE — 99282 EMERGENCY DEPT VISIT SF MDM: CPT

## 2024-10-10 PROCEDURE — 96372 THER/PROPH/DIAG INJ SC/IM: CPT

## 2024-10-10 PROCEDURE — 99284 EMERGENCY DEPT VISIT MOD MDM: CPT | Performed by: INTERNAL MEDICINE

## 2024-10-10 RX ORDER — KETOROLAC TROMETHAMINE 30 MG/ML
30 INJECTION, SOLUTION INTRAMUSCULAR; INTRAVENOUS ONCE
Status: COMPLETED | OUTPATIENT
Start: 2024-10-10 | End: 2024-10-10

## 2024-10-10 RX ORDER — CYCLOBENZAPRINE HCL 10 MG
10 TABLET ORAL ONCE
Status: COMPLETED | OUTPATIENT
Start: 2024-10-10 | End: 2024-10-10

## 2024-10-10 RX ORDER — SENNOSIDES 8.6 MG
650 CAPSULE ORAL EVERY 8 HOURS PRN
Qty: 30 TABLET | Refills: 0 | Status: SHIPPED | OUTPATIENT
Start: 2024-10-10

## 2024-10-10 RX ORDER — NAPROXEN 500 MG/1
500 TABLET ORAL 2 TIMES DAILY WITH MEALS
Qty: 30 TABLET | Refills: 0 | Status: SHIPPED | OUTPATIENT
Start: 2024-10-10

## 2024-10-10 RX ORDER — CYCLOBENZAPRINE HCL 10 MG
10 TABLET ORAL 2 TIMES DAILY PRN
Qty: 20 TABLET | Refills: 0 | Status: SHIPPED | OUTPATIENT
Start: 2024-10-10

## 2024-10-10 RX ADMIN — KETOROLAC TROMETHAMINE 30 MG: 30 INJECTION, SOLUTION INTRAMUSCULAR; INTRAVENOUS at 14:36

## 2024-10-10 RX ADMIN — CYCLOBENZAPRINE HYDROCHLORIDE 10 MG: 10 TABLET, FILM COATED ORAL at 14:36

## 2024-10-10 NOTE — TELEPHONE ENCOUNTER
Called pharmacy- stated they do not need that as an insurance plan for the patient and that they would reach out to patient/insurance regarding insurance information for [facility] as this was the insurance that received an approval.

## 2024-10-10 NOTE — Clinical Note
Dolly Painting was seen and treated in our emergency department on 10/10/2024.                Diagnosis:     Dolly  .    She may return on this date: 10/14/2024         If you have any questions or concerns, please don't hesitate to call.      Brenda Souza MD    ______________________________           _______________          _______________  Hospital Representative                              Date                                Time

## 2024-10-10 NOTE — ED PROVIDER NOTES
I saw and evaluated the patient. All available labs and X-rays were reviewed. I discussed the patient with the resident / non-physician and agree with the resident's / non-physician practitioner's findings and plan as documented in the resident's / non-physician practicitioner's note, except where noted. At this point, I agree with the current assessment done in the ED.     NAME: Dolly Painting  AGE: 44 y.o. SEX: female  : 1980   MRN: [id number]  ENCOUNTER: [id number]    DATE: 10/10/2024  TIME: 5:15 PM      History of Present Illness   Dolly Painting is a 44 y.o. female who presents with Back Pain (X 2 weeks.  No injury or trauma.  No nsaids pta)    has a past medical history of Blind left eye, Major depression (), Prosthetic eye globe, and Pyelonephritis ().. Right side is worse than left. It radiates down her leg.  Worse with certain movements. No fever, IVDU, neuro deficits, point tenderness, saddle anesthesia, trauma, fall, urinary retention, new incontinence, weight loss, cancer, pulsatile abdominal mass. No CVA tenderness, abdominal or pelvic pain.     Past Medical History     Past Medical History:   Diagnosis Date    Blind left eye     Major depression     Prosthetic eye globe     Pyelonephritis 2014    w/sepsis       Past Surgical History     Past Surgical History:   Procedure Laterality Date     SECTION      x3    EYE SURGERY         Social History     Social History     Substance and Sexual Activity   Alcohol Use Yes    Comment: occasional      Social History     Substance and Sexual Activity   Drug Use No     Social History     Tobacco Use   Smoking Status Every Day    Current packs/day: 0.25    Average packs/day: 0.3 packs/day for 4.0 years (1.0 ttl pk-yrs)    Types: Cigarettes    Passive exposure: Current   Smokeless Tobacco Never       Family History     Family History   Problem Relation Age of Onset    Diabetes Family         Mellitus    Diabetes Mother          Mellitus       Medications Prior to Admission     Prior to Admission medications    Medication Sig Start Date End Date Taking? Authorizing Provider   acetaminophen (TYLENOL) 650 mg CR tablet Take 1 tablet (650 mg total) by mouth every 8 (eight) hours as needed for mild pain 10/10/24  Yes Brenda Souza MD   cyclobenzaprine (FLEXERIL) 10 mg tablet Take 1 tablet (10 mg total) by mouth 2 (two) times a day as needed for muscle spasms 10/10/24  Yes Brenda Souza MD   Diclofenac Sodium (VOLTAREN) 1 % Apply 2 g topically 4 (four) times a day 10/10/24  Yes Brenda Souza MD   naproxen (Naprosyn) 500 mg tablet Take 1 tablet (500 mg total) by mouth 2 (two) times a day with meals 10/10/24  Yes Brenda Souza MD   cyclobenzaprine (FLEXERIL) 5 mg tablet Take 1 tablet (5 mg total) by mouth 3 (three) times a day as needed for muscle spasms  Patient not taking: Reported on 8/16/2021 10/12/20   Vicenta Campso PA-C   Diclofenac Sodium (VOLTAREN) 1 % Apply 2 g topically 4 (four) times a day  Patient not taking: Reported on 10/1/2024 11/16/21   Fantasma Ellsworth DPM   doxycycline (ORACEA) 40 MG capsule Take 1 capsule (40 mg total) by mouth every morning for 10 days 7/21/23 7/31/23  Carlos Alberto Centeno PA-C   famotidine (PEPCID) 20 mg tablet Take 1 tablet (20 mg total) by mouth 2 (two) times a day  Patient not taking: Reported on 7/2/2019 6/24/19   Linda Bradshaw PA-C   loratadine (CLARITIN) 10 mg tablet Take 1 tablet (10 mg total) by mouth daily  Patient not taking: Reported on 8/16/2021 6/29/21   Polo Tirado MD   meloxicam (MOBIC) 15 mg tablet TAKE 1 TABLET(15 MG) BY MOUTH DAILY  Patient not taking: Reported on 10/1/2024 11/18/21   Fantasma Ellsworth DPM   methocarbamol (ROBAXIN) 750 mg tablet Take 1 tablet (750 mg total) by mouth every 6 (six) hours as needed for muscle spasms  Patient not taking: Reported on 8/16/2021 10/16/20   Nelly Palma PA-C   methylPREDNISolone 4 MG tablet therapy pack Use as directed on  package  Patient not taking: Reported on 4/11/2022 12/21/21   Fantasmacj EllsworthHARINI   predniSONE 50 mg tablet Take 100 mg by mouth daily  Patient not taking: Reported on 8/16/2021    Historical Provider, MD Wangizumab-rzaa (SKYRIZI IV) Inject into a catheter in a vein    Historical Provider, MD   Semaglutide-Weight Management (WEGOVY) 0.25 MG/0.5ML Inject 0.5 mL (0.25 mg total) under the skin once a week 10/1/24 10/31/24  Salvador Hairston PA-C       Allergies     Allergies   Allergen Reactions    Morphine Sulfate Shortness Of Breath    Amoxicillin Hives    Clavulanic Acid Hives    Fentanyl     Lurasidone GI Intolerance    Morphine And Codeine        Objective     Vitals:    10/10/24 1407   BP: 119/87   BP Location: Right arm   Pulse: 95   Resp: 18   Temp: 97.9 °F (36.6 °C)   TempSrc: Tympanic   SpO2: 97%   Weight: 93 kg (205 lb 0.4 oz)     Body mass index is 37.5 kg/m².  No intake or output data in the 24 hours ending 10/10/24 1715  Invasive Devices       None                   Physical Exam  General: awake, alert, no acute distress  Head: normocephalic, atraumatic  Eyes: no scleral icterus  Ears: external ears normal, hearing grossly intact  Nose: external exam grossly normal  Neck: symmetric, No JVD noted, trachea midline  Pulmonary: no respiratory distress, no tachypnea noted  Cardiovascular: appears well perfused  Abdomen: no distention noted  Musculoskeletal: no deformities noted, tone normal  Neuro: grossly non-focal  Psych: mood and affect appropriate    Lab Results:  Labs Reviewed - No data to display      Imaging:   No orders to display         Medications given in Emergency Department     Medication Administration - last 24 hours from 10/09/2024 1715 to 10/10/2024 1715         Date/Time Order Dose Route Action Action by     10/10/2024 1436 EDT ketorolac (TORADOL) injection 30 mg 30 mg Intramuscular Given Deann Dozier RN     10/10/2024 1436 EDT cyclobenzaprine (FLEXERIL) tablet 10 mg 10 mg Oral Given Deann COMBS  JUDITH Dozier            Assessment and Plan  The patient presented complaining of back pain. There was no history of recent fall or blunt trauma. However, history elicited activity likely causing muscular strain and injury. There was no evidence to support genitourinary etiology. No fevers or other evidence to suspect infectious processes, abscess, osteomyelitis etc. The patient's neurological exam is normal with normal motor and sensory. There is no saddle paresthesias reported and no bowel or bladder incontinence or retention. Denies fevers, chills, sweats. No saddle anesthesia. Full strength and sensation bilateral lower extremities. No loss of bowel or bladder function. Denies IV drug use. Denies history of cancer. Denies direct trauma. No unexpected weight loss. No long term steroid use. No pulsatile abdominal mass. No hematuria. No HIV, Transplant or systemic corticosteroids. No midline tenderness.      I estimate there is LOW risk for AAA, Cauda Equina, Epidural Mass Lesion, Spinal Stenosis, or herniated disk causing severe stenosis, thus I consider the discharge disposition reasonable. We have discussed the diagnosis and risks, and we agree with discharging home to follow-up with their primary doctor and following up with [facility]. We also discussed returning to the Emergency Department immediately if new or worsening symptoms occur. We have discussed the symptoms which are most concerning (e.g., saddle anesthesia, urinary or bowel incontinence or retention, fevers, changing or worsening pain) that necessitate immediate return.  The patient understands and agrees with plan of care.  Questions and concerns answered.      Active Problems:  There are no active Hospital Problems.      Final Diagnosis:  1. Acute low back pain                          Brenda Souza MD  10/10/24 6110

## 2024-10-10 NOTE — TELEPHONE ENCOUNTER
Spoke with patient. States that she went to pharmacy and they stated Wegovy was still denied. Informed pt I would call the pharmacy to run it through secondary insurance.

## 2024-10-11 ENCOUNTER — TELEPHONE (OUTPATIENT)
Dept: PHYSICAL THERAPY | Facility: OTHER | Age: 44
End: 2024-10-11

## 2024-10-11 NOTE — TELEPHONE ENCOUNTER
Call placed to the patient per [facility] referral.    V/M left for patient to call back.Phone number and hours of business provided.    This is the 1st attempt to reach the patient. Will defer referral per protocol.

## 2024-10-14 ENCOUNTER — HOSPITAL ENCOUNTER (EMERGENCY)
Facility: HOSPITAL | Age: 44
Discharge: HOME | End: 2024-10-15
Attending: EMERGENCY MEDICINE | Admitting: EMERGENCY MEDICINE
Payer: COMMERCIAL

## 2024-10-14 ENCOUNTER — APPOINTMENT (EMERGENCY)
Dept: CT IMAGING | Facility: HOSPITAL | Age: 44
End: 2024-10-14
Payer: COMMERCIAL

## 2024-10-14 DIAGNOSIS — M54.50 LOW BACK PAIN: ICD-10-CM

## 2024-10-14 DIAGNOSIS — M51.27 LUMBOSACRAL DISC HERNIATION: Primary | ICD-10-CM

## 2024-10-14 LAB
ANION GAP SERPL CALCULATED.3IONS-SCNC: 6 MMOL/L (ref 4–13)
BACTERIA UR QL AUTO: ABNORMAL /HPF
BASOPHILS # BLD AUTO: 0.06 THOUSANDS/ΜL (ref 0–0.1)
BASOPHILS NFR BLD AUTO: 1 % (ref 0–1)
BILIRUB UR QL STRIP: NEGATIVE
BUN SERPL-MCNC: 16 MG/DL (ref 5–25)
CALCIUM SERPL-MCNC: 9.6 MG/DL (ref 8.4–10.2)
CHLORIDE SERPL-SCNC: 103 MMOL/L (ref 96–108)
CLARITY UR: CLEAR
CO2 SERPL-SCNC: 28 MMOL/L (ref 21–32)
COLOR UR: ABNORMAL
CREAT SERPL-MCNC: 0.75 MG/DL (ref 0.6–1.3)
EOSINOPHIL # BLD AUTO: 0.3 THOUSAND/ΜL (ref 0–0.61)
EOSINOPHIL NFR BLD AUTO: 3 % (ref 0–6)
ERYTHROCYTE [DISTWIDTH] IN BLOOD BY AUTOMATED COUNT: 13.1 % (ref 11.6–15.1)
EXT PREGNANCY TEST URINE: NEGATIVE
EXT. CONTROL: NORMAL
GFR SERPL CREATININE-BSD FRML MDRD: 96 ML/MIN/1.73SQ M
GLUCOSE SERPL-MCNC: 86 MG/DL (ref 65–140)
GLUCOSE UR STRIP-MCNC: NEGATIVE MG/DL
HCT VFR BLD AUTO: 46.6 % (ref 34.8–46.1)
HGB BLD-MCNC: 16 G/DL (ref 11.5–15.4)
HGB UR QL STRIP.AUTO: ABNORMAL
IMM GRANULOCYTES # BLD AUTO: 0.02 THOUSAND/UL (ref 0–0.2)
IMM GRANULOCYTES NFR BLD AUTO: 0 % (ref 0–2)
KETONES UR STRIP-MCNC: NEGATIVE MG/DL
LEUKOCYTE ESTERASE UR QL STRIP: ABNORMAL
LYMPHOCYTES # BLD AUTO: 3.17 THOUSANDS/ΜL (ref 0.6–4.47)
LYMPHOCYTES NFR BLD AUTO: 33 % (ref 14–44)
MCH RBC QN AUTO: 31.3 PG (ref 26.8–34.3)
MCHC RBC AUTO-ENTMCNC: 34.3 G/DL (ref 31.4–37.4)
MCV RBC AUTO: 91 FL (ref 82–98)
MONOCYTES # BLD AUTO: 0.78 THOUSAND/ΜL (ref 0.17–1.22)
MONOCYTES NFR BLD AUTO: 8 % (ref 4–12)
MUCOUS THREADS UR QL AUTO: ABNORMAL
NEUTROPHILS # BLD AUTO: 5.26 THOUSANDS/ΜL (ref 1.85–7.62)
NEUTS SEG NFR BLD AUTO: 55 % (ref 43–75)
NITRITE UR QL STRIP: NEGATIVE
NON-SQ EPI CELLS URNS QL MICRO: ABNORMAL /HPF
NRBC BLD AUTO-RTO: 0 /100 WBCS
PH UR STRIP.AUTO: 6 [PH]
PLATELET # BLD AUTO: 269 THOUSANDS/UL (ref 149–390)
PMV BLD AUTO: 10.5 FL (ref 8.9–12.7)
POTASSIUM SERPL-SCNC: 4 MMOL/L (ref 3.5–5.3)
PROT UR STRIP-MCNC: NEGATIVE MG/DL
RBC # BLD AUTO: 5.11 MILLION/UL (ref 3.81–5.12)
RBC #/AREA URNS AUTO: ABNORMAL /HPF
SODIUM SERPL-SCNC: 137 MMOL/L (ref 135–147)
SP GR UR STRIP.AUTO: 1.02 (ref 1–1.03)
UROBILINOGEN UR STRIP-ACNC: <2 MG/DL
WBC # BLD AUTO: 9.59 THOUSAND/UL (ref 4.31–10.16)
WBC #/AREA URNS AUTO: ABNORMAL /HPF

## 2024-10-14 PROCEDURE — 99284 EMERGENCY DEPT VISIT MOD MDM: CPT | Performed by: EMERGENCY MEDICINE

## 2024-10-14 PROCEDURE — 96375 TX/PRO/DX INJ NEW DRUG ADDON: CPT

## 2024-10-14 PROCEDURE — 99283 EMERGENCY DEPT VISIT LOW MDM: CPT

## 2024-10-14 PROCEDURE — 85025 COMPLETE CBC W/AUTO DIFF WBC: CPT

## 2024-10-14 PROCEDURE — 81025 URINE PREGNANCY TEST: CPT

## 2024-10-14 PROCEDURE — 81001 URINALYSIS AUTO W/SCOPE: CPT

## 2024-10-14 PROCEDURE — 74176 CT ABD & PELVIS W/O CONTRAST: CPT

## 2024-10-14 PROCEDURE — 36415 COLL VENOUS BLD VENIPUNCTURE: CPT

## 2024-10-14 PROCEDURE — 80048 BASIC METABOLIC PNL TOTAL CA: CPT

## 2024-10-14 PROCEDURE — 96365 THER/PROPH/DIAG IV INF INIT: CPT

## 2024-10-14 RX ORDER — LIDOCAINE 50 MG/G
2 PATCH TOPICAL ONCE
Status: DISCONTINUED | OUTPATIENT
Start: 2024-10-14 | End: 2024-10-15 | Stop reason: HOSPADM

## 2024-10-14 RX ORDER — METHOCARBAMOL 500 MG/1
500 TABLET, FILM COATED ORAL ONCE
Status: COMPLETED | OUTPATIENT
Start: 2024-10-14 | End: 2024-10-14

## 2024-10-14 RX ORDER — KETOROLAC TROMETHAMINE 30 MG/ML
30 INJECTION, SOLUTION INTRAMUSCULAR; INTRAVENOUS ONCE
Status: COMPLETED | OUTPATIENT
Start: 2024-10-14 | End: 2024-10-14

## 2024-10-14 RX ORDER — ACETAMINOPHEN 10 MG/ML
1000 INJECTION, SOLUTION INTRAVENOUS ONCE
Status: COMPLETED | OUTPATIENT
Start: 2024-10-14 | End: 2024-10-14

## 2024-10-14 RX ADMIN — KETOROLAC TROMETHAMINE 30 MG: 30 INJECTION, SOLUTION INTRAMUSCULAR; INTRAVENOUS at 21:35

## 2024-10-14 RX ADMIN — LIDOCAINE 2 PATCH: 50 PATCH CUTANEOUS at 22:30

## 2024-10-14 RX ADMIN — METHOCARBAMOL 500 MG: 500 TABLET ORAL at 22:26

## 2024-10-14 RX ADMIN — ACETAMINOPHEN 1000 MG: 10 INJECTION INTRAVENOUS at 21:39

## 2024-10-15 VITALS
BODY MASS INDEX: 37.62 KG/M2 | RESPIRATION RATE: 18 BRPM | TEMPERATURE: 97.8 F | DIASTOLIC BLOOD PRESSURE: 67 MMHG | SYSTOLIC BLOOD PRESSURE: 126 MMHG | HEART RATE: 81 BPM | OXYGEN SATURATION: 99 % | WEIGHT: 205.69 LBS

## 2024-10-15 RX ORDER — OXYCODONE AND ACETAMINOPHEN 5; 325 MG/1; MG/1
1 TABLET ORAL ONCE
Status: COMPLETED | OUTPATIENT
Start: 2024-10-15 | End: 2024-10-15

## 2024-10-15 RX ADMIN — OXYCODONE HYDROCHLORIDE AND ACETAMINOPHEN 1 TABLET: 5; 325 TABLET ORAL at 00:33

## 2024-10-15 NOTE — ED ATTENDING ATTESTATION
10/14/2024  I, Milton Johnson MD, saw and evaluated the patient. I have discussed the patient with the resident/non-physician practitioner and agree with the resident's/non-physician practitioner's findings, Plan of Care, and MDM as documented in the resident's/non-physician practitioner's note, except where noted. All available labs and Radiology studies were reviewed.  I was present for key portions of any procedure(s) performed by the resident/non-physician practitioner and I was immediately available to provide assistance.       At this point I agree with the current assessment done in the Emergency Department.  I have conducted an independent evaluation of this patient a history and physical is as follows:  Patient is a 45 yo female, comes with c/o back pain for past 2-3 weeks back, started as more on right side then became bilateral, radiating to groin, was seen in ED couple of days back, was treated symptomatically with Toradol, today tried had difficulty standing up, took several minutes, no leg weakness or numbness, no saddle anesthesia, no bowel bladder incontinence, no dysuria, or hematuria, no N/V/D, no fever or chills.  On exam, patient is conscious, alert, stable vitals, appears uncomfortable due to pain, no acute distress, no midline lumbar spine tenderness, mild tenderness to the right paraspinal lumbar region, negative SLR, motor/sensory exam intact in bilateral lower extremities.  Differential diagnosis: Lumbar strain, Lumbar disc disease, rule out compression fracture, renal colic, UTI, pyonephritis, will check labs, urine, get CT scan renal protocol with lumbar recons, give pain meds.    ED Course     Labs, urine results reviewed. Pain improved with pain meds.    CT results pending, case discussed in ED sign out with Dr. Ryan Keller.    Critical Care Time  Procedures

## 2024-10-15 NOTE — DISCHARGE INSTRUCTIONS
You were seen in the emergency department today for back pain.    Your CT showed signs of a herniated disc. We did not see signs of spine fracture or kidney stones.    You should return to the emergency department if you experience fever, loss of sensation, loss of bladder or bowel continence, or if you are unable to hold your foot up.    You can continue to treat your pain with the flexeril and voltaren gel you were previously prescribed. You can take motrin or naproxen and tylenol every 6 hours. Heat or ice may also help your symptoms. It is important to stretch and stay active.     Thank you for choosing [facility]!    COMPREHENSIVE SPINE CONTACT INFORMATION (you have an active referral): The Comprehensive Sp[facility] program is a virtual, rapid-referral triage program for back-pain patients. To contact the Comprehensive Spine triage team for scheduling or for questions, please call [phone] and follow prompts for Comprehensive Spine.

## 2024-10-16 ENCOUNTER — NURSE TRIAGE (OUTPATIENT)
Dept: PHYSICAL THERAPY | Facility: OTHER | Age: 44
End: 2024-10-16

## 2024-10-16 DIAGNOSIS — G89.29 ACUTE EXACERBATION OF CHRONIC LOW BACK PAIN: Primary | ICD-10-CM

## 2024-10-16 DIAGNOSIS — M54.50 ACUTE EXACERBATION OF CHRONIC LOW BACK PAIN: Primary | ICD-10-CM

## 2024-10-16 NOTE — TELEPHONE ENCOUNTER
"Additional Information   Negative: Is this related to a work injury?   Negative: Is this related to an MVA?   Negative: Are you currently recieving homecare services?    Background - Initial Assessment  Clinical complaint: ED visit 10/10 and 10/14 due to B/L Lower Back Pain mostly on the right side getting worse for the past 2 \"almost 3 weeks\". Hx of back pain for 1 year. It radiates into the ribs on the right side, abdomen down the pelvic, groin area, tailbone and \"sometimes\" down the right leg. No pain in her mid or upper back. No pain in her neck or arms. Numbness and tingling in lower back. NKI. Not seeing a spine specialist. Pain is constant, persistent \"everyday\". Worse when standing for too long, can't bend over or sit for too long. Patient described pain as sharp, stabbing, tight and stiff. Pt has done a CT Lumbar Spine showing bulging discs.   Date of onset: ongoing for 1 year, worsened the past 2-3 weeks  Frequency of pain: constant, persistent.  Quality of pain: numb, sharp, stiff, tight, stabbing, and tingling.    Protocols used: [facility] Protocol    "

## 2024-10-16 NOTE — TELEPHONE ENCOUNTER
Additional Information   Negative: Has the patient had unexplained weight loss?   Negative: Does the patient have a fever?   Negative: Is the patient experiencing blood in sputum?   Negative: Is the patient experiencing urine retention?   Negative: Is the patient experiencing acute drop foot or paralysis?   Negative: Has the patient experienced major trauma? (fall from height, high speed collision, direct blow to spine) and is also experiencing nausea, light-headedness, or loss of consciousness?   Negative: Is this a chronic condition?    Protocols used: [facility] Protocol    Comprehensive Spine Program was reviewed in detail and what we can provide for their back pain.  Patient is agreeable to being triaged and would like to proceed with Physical Therapy.    Referral was placed for Physical Therapy at the [facility]. Patients information was sent to the  to make evaluation appointment. Patient made aware that the PT office  will be calling to schedule the appointment.  Patient was provided with the phone number to the PT office.    No further questions and/or concerns were voiced by the patient at this time. Patient states understanding of the referral that was placed.    Referral Closed.

## 2024-10-16 NOTE — TELEPHONE ENCOUNTER
Patient called into [facility] today 10/16 and left v/m @11:51am.    Returned patient's call @12:40pm.     V/M left for patient to call back.Phone number and hours of business provided.     This is the 2nd attempt to reach the patient. Will defer referral per protocol.    NOTE:  CT lumbar spine done 10/14.  Pt has a referral for Neurosx , no appt scheduled yet.

## 2024-10-28 ENCOUNTER — TELEPHONE (OUTPATIENT)
Dept: BARIATRICS | Facility: CLINIC | Age: 44
End: 2024-10-28

## 2024-10-28 NOTE — TELEPHONE ENCOUNTER
Patient called in to get a refill of her wegovy. She is not sure if she should increase doses or not due to her dealing with some constipation side effects from the medication. Patient would like to discuss before increasing. Patient can be reached at [phone]

## 2024-10-29 NOTE — TELEPHONE ENCOUNTER
Patient was called- patient stated that she is a little constipated. Patient was concerns if she should increase her dose being that her last pen will be use this week. Patient was informed to incorporated more fiber into her diet, to drink 64 oz of water a day. Patient was also informed to try miralax in her water. Patient requesting a dose increase to go to [facility].

## 2024-10-30 DIAGNOSIS — E66.811 OBESITY (BMI 30.0-34.9): ICD-10-CM

## 2024-10-30 NOTE — TELEPHONE ENCOUNTER
I am going to send in the 0.25mg agaain. Since she is on the lower dose I want her to have more regular BM before increasing ti.  We will increase next month if things improve

## 2024-11-05 ENCOUNTER — APPOINTMENT (EMERGENCY)
Dept: RADIOLOGY | Facility: HOSPITAL | Age: 44
End: 2024-11-05
Payer: COMMERCIAL

## 2024-11-05 ENCOUNTER — HOSPITAL ENCOUNTER (EMERGENCY)
Facility: HOSPITAL | Age: 44
Discharge: HOME | End: 2024-11-05
Attending: EMERGENCY MEDICINE
Payer: COMMERCIAL

## 2024-11-05 VITALS
RESPIRATION RATE: 18 BRPM | TEMPERATURE: 97.6 F | HEART RATE: 91 BPM | WEIGHT: 199.3 LBS | DIASTOLIC BLOOD PRESSURE: 68 MMHG | SYSTOLIC BLOOD PRESSURE: 130 MMHG | BODY MASS INDEX: 36.45 KG/M2 | OXYGEN SATURATION: 99 %

## 2024-11-05 DIAGNOSIS — M54.40 LOW BACK PAIN WITH SCIATICA: ICD-10-CM

## 2024-11-05 DIAGNOSIS — M54.50 ACUTE LOW BACK PAIN: ICD-10-CM

## 2024-11-05 DIAGNOSIS — M25.561 RIGHT KNEE PAIN: Primary | ICD-10-CM

## 2024-11-05 PROCEDURE — 99284 EMERGENCY DEPT VISIT MOD MDM: CPT | Performed by: PHYSICIAN ASSISTANT

## 2024-11-05 PROCEDURE — 96372 THER/PROPH/DIAG INJ SC/IM: CPT

## 2024-11-05 PROCEDURE — 73564 X-RAY EXAM KNEE 4 OR MORE: CPT

## 2024-11-05 PROCEDURE — 99283 EMERGENCY DEPT VISIT LOW MDM: CPT

## 2024-11-05 RX ORDER — KETOROLAC TROMETHAMINE 30 MG/ML
15 INJECTION, SOLUTION INTRAMUSCULAR; INTRAVENOUS ONCE
Status: COMPLETED | OUTPATIENT
Start: 2024-11-05 | End: 2024-11-05

## 2024-11-05 RX ORDER — OXYCODONE HYDROCHLORIDE 5 MG/1
5 TABLET ORAL ONCE
Status: COMPLETED | OUTPATIENT
Start: 2024-11-05 | End: 2024-11-05

## 2024-11-05 RX ORDER — OXYCODONE HYDROCHLORIDE 5 MG/1
5 TABLET ORAL EVERY 6 HOURS PRN
Qty: 12 TABLET | Refills: 0 | Status: SHIPPED | OUTPATIENT
Start: 2024-11-05

## 2024-11-05 RX ORDER — ACETAMINOPHEN 325 MG/1
975 TABLET ORAL ONCE
Status: COMPLETED | OUTPATIENT
Start: 2024-11-05 | End: 2024-11-05

## 2024-11-05 RX ORDER — CYCLOBENZAPRINE HCL 10 MG
10 TABLET ORAL 2 TIMES DAILY PRN
Qty: 20 TABLET | Refills: 0 | Status: SHIPPED | OUTPATIENT
Start: 2024-11-05

## 2024-11-05 RX ADMIN — KETOROLAC TROMETHAMINE 15 MG: 30 INJECTION, SOLUTION INTRAMUSCULAR; INTRAVENOUS at 12:56

## 2024-11-05 RX ADMIN — ACETAMINOPHEN 975 MG: 325 TABLET, FILM COATED ORAL at 12:53

## 2024-11-05 RX ADMIN — OXYCODONE HYDROCHLORIDE 5 MG: 5 TABLET ORAL at 12:53

## 2024-11-05 NOTE — Clinical Note
Dolly Painting was seen and treated in our emergency department on 11/5/2024.                Diagnosis:     Dolly  may return to work on return date.    She may return on this date: 11/12/2024         If you have any questions or concerns, please don't hesitate to call.      Curly Davis PA-C    ______________________________           _______________          _______________  Hospital Representative                              Date                                Time

## 2024-11-05 NOTE — DISCHARGE INSTRUCTIONS
Please refer to the attached information for strict return instructions. If symptoms worsen or new symptoms develop please return to the ER. Please follow up with neurosurgery as scheduled, as well as orthopedics for knee pain.

## 2024-11-05 NOTE — ED PROVIDER NOTES
Time reflects when diagnosis was documented in both MDM as applicable and the Disposition within this note       Time User Action Codes Description Comment    11/5/2024  1:45 PM Curly Davis [M25.561] Right knee pain     11/5/2024  1:45 PM Curly Davis [M54.40] Low back pain with sciatica     11/5/2024  1:46 PM Curly Davis [M54.50] Acute low back pain           ED Disposition       ED Disposition   Discharge    Condition   Stable    Date/Time   Tue Nov 5, 2024  1:45 PM    Comment   Dolly Painting discharge to home/self care.                   Assessment & Plan       Medical Decision Making  R low back pain, radiation into RLE to around knee level and largely unchanged over the past month. Seen in ED x2 for similar, workup previously for. No alarm symptoms associated. Notes improvement with flexeril in particular although this has run out. She notes falling onto R knee one week ago, pain to knee anteriorly. Limping slightly as well due to pain. TTP over patella in particular - normal ROM, no ligamentous instability appreciable. No acute fracture on my initial xray read of R knee. Hinge knee brace applied and well tolerated. Able to ambulate with slight difficulty although declines crutches here. Back and knee pain improved overall with toradol/oxycodone here. Will refill flexeril per request, give short course of oxycodone for severe pain. She does have f/u with neurosurg in 9 days, will also refer to ortho for R knee pain. Return indications reviewed.     Amount and/or Complexity of Data Reviewed  Labs: ordered.  Radiology: ordered.    Risk  OTC drugs.  Prescription drug management.             Medications   ketorolac (TORADOL) injection 15 mg (15 mg Intramuscular Given 11/5/24 1256)   oxyCODONE (ROXICODONE) IR tablet 5 mg (5 mg Oral Given 11/5/24 1253)   acetaminophen (TYLENOL) tablet 975 mg (975 mg Oral Given 11/5/24 1253)       ED Risk Strat Scores                            SBIRT 20yo+      Flowsheet Row Most Recent Value   Initial Alcohol Screen: US AUDIT-C     1. How often do you have a drink containing alcohol? 0 Filed at: 2024   2. How many drinks containing alcohol do you have on a typical day you are drinking?  0 Filed at: 2024   3b. FEMALE Any Age, or MALE 65+: How often do you have 4 or more drinks on one occassion? 0 Filed at: 2024   Audit-C Score 0 Filed at: 2024   TERESA: How many times in the past year have you...    Used an illegal drug or used a prescription medication for non-medical reasons? Never Filed at: 2024                            History of Present Illness       Chief Complaint   Patient presents with    Knee Pain     Pt states slipping and landing on R knee about a week ago. States feels a lot of pressure w/ burning. 10/10 pain.        Past Medical History:   Diagnosis Date    Blind left eye     Major depression 2013    Prosthetic eye globe     Pyelonephritis 2014    w/sepsis      Past Surgical History:   Procedure Laterality Date     SECTION      x3    EYE SURGERY        Family History   Problem Relation Age of Onset    Diabetes Family         Mellitus    Diabetes Mother         Mellitus      Social History     Tobacco Use    Smoking status: Every Day     Current packs/day: 0.25     Average packs/day: 0.3 packs/day for 4.0 years (1.0 ttl pk-yrs)     Types: Cigarettes     Passive exposure: Current    Smokeless tobacco: Never   Vaping Use    Vaping status: Never Used   Substance Use Topics    Alcohol use: Yes     Comment: occasional     Drug use: No      E-Cigarette/Vaping    E-Cigarette Use Never User       E-Cigarette/Vaping Substances      I have reviewed and agree with the history as documented.     Dolly is a 43 yo F presenting with ongoing pain to R lower back/leg over the past month as well as pain to R knee after a fall a week ago. She was seen for the lower back pain on  10/10 and 10/14 with workup for same including CT l-spine. Reports did have some relief with flexeril but this has run out. One week ago she reports falling and landing on R knee anteriorly. Her knee was swollen although this swelling has since improved overall. She has had persistent pain in the knee anteriorly however and has been limping as a result. The back pain which radiates to her R thigh is largely unchanged in character. No loss of control of bowel or bladder function. No saddle anesthesia. No fevers/chills/IVDA.           History provided by:  Patient   used: No        Review of Systems   Constitutional:  Negative for chills and fever.   HENT:  Negative for congestion, rhinorrhea and sore throat.    Eyes:  Negative for pain and visual disturbance.   Respiratory:  Negative for cough, shortness of breath and wheezing.    Cardiovascular:  Negative for chest pain and palpitations.   Gastrointestinal:  Negative for abdominal pain, nausea and vomiting.   Genitourinary:  Negative for dysuria, frequency and urgency.   Musculoskeletal:  Positive for arthralgias, back pain and joint swelling. Negative for neck pain and neck stiffness.   Skin:  Negative for rash and wound.   Neurological:  Negative for dizziness, weakness, light-headedness and numbness.           Objective       ED Triage Vitals   Temperature Pulse Blood Pressure Respirations SpO2 Patient Position - Orthostatic VS   11/05/24 1209 11/05/24 1209 11/05/24 1209 11/05/24 1209 11/05/24 1209 11/05/24 1209   97.6 °F (36.4 °C) 91 130/68 18 99 % Sitting      Temp Source Heart Rate Source BP Location FiO2 (%) Pain Score    11/05/24 1209 11/05/24 1209 11/05/24 1209 -- 11/05/24 1253    Oral Monitor Right arm  10 - Worst Possible Pain      Vitals      Date and Time Temp Pulse SpO2 Resp BP Pain Score FACES Pain Rating User   11/05/24 1256 -- -- -- -- -- 10 - Worst Possible Pain -- AB   11/05/24 1253 -- -- -- -- -- 10 - Worst Possible Pain -- AB    11/05/24 1209 97.6 °F (36.4 °C) 91 99 % 18 130/68 -- -- DR            Physical Exam  Constitutional:       General: She is not in acute distress.     Appearance: She is well-developed. She is not diaphoretic.   HENT:      Head: Normocephalic and atraumatic.      Right Ear: External ear normal.      Left Ear: External ear normal.   Eyes:      Extraocular Movements:      Right eye: Normal extraocular motion.      Left eye: Normal extraocular motion.      Conjunctiva/sclera: Conjunctivae normal.      Pupils: Pupils are equal, round, and reactive to light.   Cardiovascular:      Rate and Rhythm: Normal rate and regular rhythm.   Pulmonary:      Effort: Pulmonary effort is normal. No accessory muscle usage or respiratory distress.   Abdominal:      General: Abdomen is flat. There is no distension.   Musculoskeletal:      Cervical back: Normal range of motion. No rigidity.      Comments: No midline T/L TTP. Does note R lumbar paraspinal TTP extending toward R glute. 5/5 strength to b/l LE's grossly. Normal ROM R hip in abduction/internal/external rotation. TTP to R knee anteriorly over patella. Minimal swelling to R knee diffusely. No increased erythema/increased warmth to R knee. Normal ROM R knee in flexion-extension. Negative Lachman's, neg Maria Victoria's, neg anterior/posterior drawer. 2+ PT/DP pulses distally.    Skin:     General: Skin is warm and dry.      Capillary Refill: Capillary refill takes less than 2 seconds.      Findings: No erythema or rash.   Neurological:      Mental Status: She is alert and oriented to person, place, and time.      Motor: No abnormal muscle tone.      Coordination: Coordination normal.   Psychiatric:         Behavior: Behavior normal.         Thought Content: Thought content normal.         Judgment: Judgment normal.         Results Reviewed       Procedure Component Value Units Date/Time    POCT pregnancy, urine [[id number]]     Lab Status: No result             XR knee 4+ vw right  injury    (Results Pending)       Procedures    ED Medication and Procedure Management   Prior to Admission Medications   Prescriptions Last Dose Informant Patient Reported? Taking?   Diclofenac Sodium (VOLTAREN) 1 %   No No   Sig: Apply 2 g topically 4 (four) times a day   Patient not taking: Reported on 10/1/2024   Diclofenac Sodium (VOLTAREN) 1 %   No No   Sig: Apply 2 g topically 4 (four) times a day   Risankizumab-rzaa (SKYRIZI IV)   Yes No   Sig: Inject into a catheter in a vein   Semaglutide-Weight Management (WEGOVY) 0.25 MG/0.5ML   No No   Sig: Inject 0.5 mL (0.25 mg total) under the skin once a week   acetaminophen (TYLENOL) 650 mg CR tablet   No No   Sig: Take 1 tablet (650 mg total) by mouth every 8 (eight) hours as needed for mild pain   cyclobenzaprine (FLEXERIL) 10 mg tablet   No No   Sig: Take 1 tablet (10 mg total) by mouth 2 (two) times a day as needed for muscle spasms   cyclobenzaprine (FLEXERIL) 10 mg tablet   No Yes   Sig: Take 1 tablet (10 mg total) by mouth 2 (two) times a day as needed for muscle spasms   doxycycline (ORACEA) 40 MG capsule   No No   Sig: Take 1 capsule (40 mg total) by mouth every morning for 10 days   famotidine (PEPCID) 20 mg tablet   No No   Sig: Take 1 tablet (20 mg total) by mouth 2 (two) times a day   Patient not taking: Reported on 7/2/2019   loratadine (CLARITIN) 10 mg tablet   No No   Sig: Take 1 tablet (10 mg total) by mouth daily   Patient not taking: Reported on 8/16/2021   meloxicam (MOBIC) 15 mg tablet   No No   Sig: TAKE 1 TABLET(15 MG) BY MOUTH DAILY   Patient not taking: Reported on 10/1/2024   methylPREDNISolone 4 MG tablet therapy pack   No No   Sig: Use as directed on package   Patient not taking: Reported on 4/11/2022   naproxen (Naprosyn) 500 mg tablet   No No   Sig: Take 1 tablet (500 mg total) by mouth 2 (two) times a day with meals   predniSONE 50 mg tablet  Self Yes No   Sig: Take 100 mg by mouth daily   Patient not taking: Reported on 8/16/2021       Facility-Administered Medications: None     Discharge Medication List as of 11/5/2024  1:47 PM        START taking these medications    Details   oxyCODONE (Roxicodone) 5 immediate release tablet Take 1 tablet (5 mg total) by mouth every 6 (six) hours as needed for severe pain for up to 12 doses Max Daily Amount: 20 mg, Starting Tue 11/5/2024, Normal           CONTINUE these medications which have CHANGED    Details   cyclobenzaprine (FLEXERIL) 10 mg tablet Take 1 tablet (10 mg total) by mouth 2 (two) times a day as needed for muscle spasms, Starting Tue 11/5/2024, Normal           CONTINUE these medications which have NOT CHANGED    Details   acetaminophen (TYLENOL) 650 mg CR tablet Take 1 tablet (650 mg total) by mouth every 8 (eight) hours as needed for mild pain, Starting Thu 10/10/2024, Normal      !! Diclofenac Sodium (VOLTAREN) 1 % Apply 2 g topically 4 (four) times a day, Starting Tue 11/16/2021, Normal      !! Diclofenac Sodium (VOLTAREN) 1 % Apply 2 g topically 4 (four) times a day, Starting Thu 10/10/2024, Normal      doxycycline (ORACEA) 40 MG capsule Take 1 capsule (40 mg total) by mouth every morning for 10 days, Starting Fri 7/21/2023, Until Mon 7/31/2023, Normal      famotidine (PEPCID) 20 mg tablet Take 1 tablet (20 mg total) by mouth 2 (two) times a day, Starting Mon 6/24/2019, Print      loratadine (CLARITIN) 10 mg tablet Take 1 tablet (10 mg total) by mouth daily, Starting Tue 6/29/2021, Normal      meloxicam (MOBIC) 15 mg tablet TAKE 1 TABLET(15 MG) BY MOUTH DAILY, Normal      methylPREDNISolone 4 MG tablet therapy pack Use as directed on package, Normal      naproxen (Naprosyn) 500 mg tablet Take 1 tablet (500 mg total) by mouth 2 (two) times a day with meals, Starting Thu 10/10/2024, Normal      predniSONE 50 mg tablet Take 100 mg by mouth daily, Historical Med      Risankizumab-rzaa (SKYRIZI IV) Inject into a catheter in a vein, Historical Med      Semaglutide-Weight Management (WEGOVY)  0.25 MG/0.5ML Inject 0.5 mL (0.25 mg total) under the skin once a week, Starting Wed 10/30/2024, Until Fri 11/29/2024, Normal       !! - Potential duplicate medications found. Please discuss with provider.          ED SEPSIS DOCUMENTATION   Time reflects when diagnosis was documented in both MDM as applicable and the Disposition within this note       Time User Action Codes Description Comment    11/5/2024  1:45 PM Curly Davis Add [M25.561] Right knee pain     11/5/2024  1:45 PM Curly Davis Add [M54.40] Low back pain with sciatica     11/5/2024  1:46 PM Curly Davis Add [M54.50] Acute low back pain                  Curly Davis PA-C  11/05/24 1401

## 2024-11-07 NOTE — TELEPHONE ENCOUNTER
Patient called requesting refill for Wegovy 0.25 mg. Patient made aware medication was refilled on 10/30/24 for 2 ml with 0 refills to [facility] pharmacy. Patient instructed to contact the pharmacy to obtain refills of medication. Patient verbalized understanding.

## 2024-11-11 ENCOUNTER — OFFICE VISIT (OUTPATIENT)
Dept: OBGYN CLINIC | Facility: MEDICAL CENTER | Age: 44
End: 2024-11-11
Payer: COMMERCIAL

## 2024-11-11 VITALS
HEIGHT: 62 IN | SYSTOLIC BLOOD PRESSURE: 122 MMHG | WEIGHT: 195 LBS | HEART RATE: 106 BPM | DIASTOLIC BLOOD PRESSURE: 89 MMHG | BODY MASS INDEX: 35.88 KG/M2

## 2024-11-11 DIAGNOSIS — Z12.11 ENCOUNTER FOR SCREENING COLONOSCOPY: Primary | ICD-10-CM

## 2024-11-11 DIAGNOSIS — M25.561 RIGHT KNEE PAIN: ICD-10-CM

## 2024-11-11 PROCEDURE — 99203 OFFICE O/P NEW LOW 30 MIN: CPT | Performed by: ORTHOPAEDIC SURGERY

## 2024-11-11 PROCEDURE — 20610 DRAIN/INJ JOINT/BURSA W/O US: CPT | Performed by: ORTHOPAEDIC SURGERY

## 2024-11-11 RX ORDER — METHYLPREDNISOLONE ACETATE 40 MG/ML
1 INJECTION, SUSPENSION INTRA-ARTICULAR; INTRALESIONAL; INTRAMUSCULAR; SOFT TISSUE
Status: COMPLETED | OUTPATIENT
Start: 2024-11-11 | End: 2024-11-11

## 2024-11-11 RX ORDER — BUPIVACAINE HYDROCHLORIDE 2.5 MG/ML
4 INJECTION, SOLUTION INFILTRATION; PERINEURAL
Status: COMPLETED | OUTPATIENT
Start: 2024-11-11 | End: 2024-11-11

## 2024-11-11 RX ADMIN — BUPIVACAINE HYDROCHLORIDE 4 ML: 2.5 INJECTION, SOLUTION INFILTRATION; PERINEURAL at 13:15

## 2024-11-11 RX ADMIN — METHYLPREDNISOLONE ACETATE 1 ML: 40 INJECTION, SUSPENSION INTRA-ARTICULAR; INTRALESIONAL; INTRAMUSCULAR; SOFT TISSUE at 13:15

## 2024-11-11 NOTE — PROGRESS NOTES
Ortho Sports Medicine Knee New Patient Visit     Assesment:   44 y.o. female right knee pain possibly secondary to L5-S1 disc herniation.    Plan:    Patient received steroid injection in right knee today.   Patient will be seeing spine/pain this week for evaluation of disc herniation.   Physical therapy referral placed today.  Patient to follow-up in 6 to 8 weeks for recheck.       Conservative treatment:    Ice to knee for 20 minutes at least 1-2 times daily.  OTC NSAIDS prn for pain.    Imaging:    All imaging from today was reviewed by myself and explained to the patient.       Injection:    A corticosteroid injection was performed.      Surgery:     No surgery is recommended at this point, continue with conservative treatment plan as noted.      Follow up:    6-8 weeks for recheck    Large joint arthrocentesis: R knee  Universal Protocol:  Consent: Verbal consent obtained.  Risks and benefits: risks, benefits and alternatives were discussed  Consent given by: patient  Patient understanding: patient states understanding of the procedure being performed  Site marked: the operative site was marked  Patient identity confirmed: verbally with patient  Supporting Documentation  Indications: pain and joint swelling   Procedure Details  Location: knee - R knee  Needle size: 22 G  Ultrasound guidance: no  Approach: anteromedial  Medications administered: 1 mL methylPREDNISolone acetate 40 mg/mL; 4 mL bupivacaine 0.25 %    Patient tolerance: patient tolerated the procedure well with no immediate complications  Dressing:  Sterile dressing applied             Chief Complaint   Patient presents with    Right Knee - Pain       History of Present Illness:    The patient is a 44 y.o. female whose occupation is unknown, referred to me by urgent care, seen in clinic for evaluation of right knee pain.      Patient reports right knee pain that is radiating in nature coming from the lower back and radiating down to the mid shin.   Pain has been ongoing for over 1 month described as 6/10, sharp and persistent in nature.  She has attempted conservative treatment with ice, OTC medication with no improvements. Patient was seen in the emergency department and found to have a L5/-S1 herniated disc.    Patient denies feelings of knee instability. Patient has upcoming appointment with spine and pain for further evaluation of disc herniation and associated pain management.  Patient concerned of effects of pain following work and unable to receive steroid injection today    Knee Surgical History:  None    Past Medical, Social and Family History:  Past Medical History:   Diagnosis Date    Blind left eye     Major depression 2013    Prosthetic eye globe     Pyelonephritis 2014    w/sepsis     Past Surgical History:   Procedure Laterality Date     SECTION      x3    EYE SURGERY       Allergies   Allergen Reactions    Morphine Sulfate Shortness Of Breath    Amoxicillin Hives    Clavulanic Acid Hives    Fentanyl     Lurasidone GI Intolerance    Morphine And Codeine      Current Outpatient Medications on File Prior to Visit   Medication Sig Dispense Refill    acetaminophen (TYLENOL) 650 mg CR tablet Take 1 tablet (650 mg total) by mouth every 8 (eight) hours as needed for mild pain 30 tablet 0    cyclobenzaprine (FLEXERIL) 10 mg tablet Take 1 tablet (10 mg total) by mouth 2 (two) times a day as needed for muscle spasms 20 tablet 0    naproxen (Naprosyn) 500 mg tablet Take 1 tablet (500 mg total) by mouth 2 (two) times a day with meals 30 tablet 0    Risankizumab-rzaa (SKYRIZI IV) Inject into a catheter in a vein      Semaglutide-Weight Management (WEGOVY) 0.25 MG/0.5ML Inject 0.5 mL (0.25 mg total) under the skin once a week 2 mL 0    Diclofenac Sodium (VOLTAREN) 1 % Apply 2 g topically 4 (four) times a day (Patient not taking: Reported on 10/1/2024) 50 g 1    Diclofenac Sodium (VOLTAREN) 1 % Apply 2 g topically 4 (four) times a day (Patient not  taking: Reported on 11/11/2024) 150 g 0    doxycycline (ORACEA) 40 MG capsule Take 1 capsule (40 mg total) by mouth every morning for 10 days 10 capsule 0    famotidine (PEPCID) 20 mg tablet Take 1 tablet (20 mg total) by mouth 2 (two) times a day (Patient not taking: Reported on 7/2/2019) 30 tablet 0    loratadine (CLARITIN) 10 mg tablet Take 1 tablet (10 mg total) by mouth daily (Patient not taking: Reported on 8/16/2021) 30 tablet 0    meloxicam (MOBIC) 15 mg tablet TAKE 1 TABLET(15 MG) BY MOUTH DAILY (Patient not taking: Reported on 10/1/2024) 90 tablet 0    methylPREDNISolone 4 MG tablet therapy pack Use as directed on package (Patient not taking: Reported on 4/11/2022) 21 each 0    oxyCODONE (Roxicodone) 5 immediate release tablet Take 1 tablet (5 mg total) by mouth every 6 (six) hours as needed for severe pain for up to 12 doses Max Daily Amount: 20 mg (Patient not taking: Reported on 11/11/2024) 12 tablet 0    predniSONE 50 mg tablet Take 100 mg by mouth daily (Patient not taking: Reported on 8/16/2021)       No current facility-administered medications on file prior to visit.     Social History     Socioeconomic History    Marital status: Single     Spouse name: Not on file    Number of children: Not on file    Years of education: Not on file    Highest education level: Not on file   Occupational History    Not on file   Tobacco Use    Smoking status: Every Day     Current packs/day: 0.25     Average packs/day: 0.3 packs/day for 4.0 years (1.0 ttl pk-yrs)     Types: Cigarettes     Passive exposure: Current    Smokeless tobacco: Never   Vaping Use    Vaping status: Never Used   Substance and Sexual Activity    Alcohol use: Yes     Comment: occasional     Drug use: No    Sexual activity: Not on file   Other Topics Concern    Not on file   Social History Narrative    Not on file     Social Determinants of Health     Financial Resource Strain: Low Risk  (8/16/2023)    Overall Financial Resource Strain (CARDIA)  "    Difficulty of Paying Living Expenses: Not hard at all   Food Insecurity: No Food Insecurity (8/16/2023)    Hunger Vital Sign     Worried About Running Out of Food in the Last Year: Never true     Ran Out of Food in the Last Year: Never true   Transportation Needs: No Transportation Needs (8/16/2023)    PRAPARE - Transportation     Lack of Transportation (Medical): No     Lack of Transportation (Non-Medical): No   Physical Activity: Not on file   Stress: Not on file   Social Connections: Not on file   Intimate Partner Violence: Not on file   Housing Stability: Not on file         I have reviewed the past medical, surgical, social and family history, medications and allergies as documented in the EMR.    Review of systems: ROS is negative other than that noted in the HPI.  Constitutional: Negative for fatigue and fever.   HENT: Negative for sore throat.    Respiratory: Negative for shortness of breath.    Cardiovascular: Negative for chest pain.   Gastrointestinal: Negative for abdominal pain.   Endocrine: Negative for cold intolerance and heat intolerance.   Genitourinary: Negative for flank pain.   Musculoskeletal: Negative for back pain.   Skin: Negative for rash.   Allergic/Immunologic: Negative for immunocompromised state.   Neurological: Negative for dizziness.   Psychiatric/Behavioral: Negative for agitation.      Physical Exam:    Blood pressure 122/89, pulse (!) 106, height 5' 2\" (1.575 m), weight 88.5 kg (195 lb), last menstrual period 08/14/2024, not currently breastfeeding.    General/Constitutional: NAD, well developed, well nourished  HENT: Normocephalic, atraumatic  CV: Intact distal pulses, regular rate  Resp: No respiratory distress or labored breathing  GI: Soft and non-tender   Lymphatic: No lymphadenopathy palpated  Neuro: Alert and Oriented x 3, no focal deficits  Psych: Normal mood, normal affect, normal judgement, normal behavior  Skin: Warm, dry, no rashes, no erythema      Knee Exam " (focused):                RIGHT LEFT   ROM:   0-130 0-130   Palpation: Effusion negative negative     MJL tenderness Positive Negative     LJL tenderness Positive Negative   Meniscus: Maria Victoria Negative Negative    Apley's Compression Negative Negative   Instability: Varus stable stable     Valgus stable stable   Special Tests: Lachman Negative Negative     Posterior drawer Negative Negative     Anterior drawer Negative Negative     Pivot shift not tested not tested     Dial not tested not tested   Patella: Palpation no tenderness no tenderness     Mobility 1/4 1/4     Apprehension Negative Negative   Other: Single leg 1/4 squat not tested not tested      LE NV Exam: +2 DP/PT pulses bilaterally  Sensation intact to light touch L2-S1 bilaterally     Bilateral hip ROM demonstrates no pain actively or passively    No calf tenderness to palpation bilaterally    Knee Imaging    X-rays of the right knee were reviewed, which demonstrate acute osseous abnormality.  I have reviewed the radiology report and agree with their impression.      Scribe Attestation      I,:   am acting as a scribe while in the presence of the attending physician.:       I,:   personally performed the services described in this documentation    as scribed in my presence.:

## 2024-11-27 DIAGNOSIS — E66.811 OBESITY (BMI 30.0-34.9): Primary | ICD-10-CM

## 2024-12-02 ENCOUNTER — TELEPHONE (OUTPATIENT)
Dept: BARIATRICS | Facility: CLINIC | Age: 44
End: 2024-12-02

## 2024-12-02 NOTE — TELEPHONE ENCOUNTER
Attempted to contacted pt to reschedule nurse visit due to no show. Pt is not accepting calls at this time. Cannot leave vm

## 2024-12-03 ENCOUNTER — OFFICE VISIT (OUTPATIENT)
Dept: NEUROSURGERY | Facility: CLINIC | Age: 44
End: 2024-12-03
Payer: COMMERCIAL

## 2024-12-03 VITALS
RESPIRATION RATE: 17 BRPM | HEIGHT: 62 IN | SYSTOLIC BLOOD PRESSURE: 118 MMHG | BODY MASS INDEX: 36.8 KG/M2 | WEIGHT: 200 LBS | TEMPERATURE: 97.2 F | DIASTOLIC BLOOD PRESSURE: 76 MMHG | HEART RATE: 80 BPM | OXYGEN SATURATION: 96 %

## 2024-12-03 DIAGNOSIS — M51.27 LUMBOSACRAL DISC HERNIATION: Primary | ICD-10-CM

## 2024-12-03 DIAGNOSIS — M54.17 LUMBOSACRAL RADICULOPATHY: ICD-10-CM

## 2024-12-03 DIAGNOSIS — M54.50 LOW BACK PAIN: ICD-10-CM

## 2024-12-03 PROCEDURE — 99204 OFFICE O/P NEW MOD 45 MIN: CPT | Performed by: NURSE PRACTITIONER

## 2024-12-03 NOTE — ASSESSMENT & PLAN NOTE
As addressed in HPI.   Orders:    MRI lumbar spine without contrast; Future    Ambulatory referral to Spine & Pain Management; Future    Ambulatory Referral to Physical Therapy; Future

## 2024-12-03 NOTE — ASSESSMENT & PLAN NOTE
As addressed in HPI  She denied B & B dysfunction , saddle anesthesia , but reports weakness in right leg and knee, is subjective  not demonstrated on PE.  Positive right st leg raise, antalgic gait.  Can heel , tor walk and tandem gait without balance loss.     Imagining  10/14/2024  CT Lumbar wo---There appears to be a broad-based disc bulge at L5-S1, most pronounced in the region of the left lateral recess and left neural foramina, resulting in left greater than right neuroforaminal narrowing. Possible mild disc bulge L4-5. MRI is recommended for further evaluation, if there are no contraindications.     Orders:    Ambulatory Referral to Neurosurgery    MRI lumbar spine without contrast; Future    Ambulatory Referral to Physical Therapy; Future  RTO 3 days to one week after MRI completion   Advised ti start PT and pain management ASAP   Ordered MRI lumbar given abnormalities on CT lumbar for a broad based disc bulge L 5 S1  and recommended MRI  for further evaluation. Patient with abtalgic gait and positive right straight leg raise.

## 2024-12-03 NOTE — PROGRESS NOTES
Name: Dolly Painting      : 1980      MRN: [id number]  Encounter Provider: ARIANNA Miller  Encounter Date: 12/3/2024   Encounter department: [facility]  :  Assessment & Plan  Lumbosacral disc herniation  As addressed in HPI  She denied B & B dysfunction , saddle anesthesia , but reports weakness in right leg and knee, is subjective  not demonstrated on PE.  Positive right st leg raise, antalgic gait.  Can heel , tor walk and tandem gait without balance loss.     Imagining  10/14/2024  CT Lumbar wo---There appears to be a broad-based disc bulge at L5-S1, most pronounced in the region of the left lateral recess and left neural foramina, resulting in left greater than right neuroforaminal narrowing. Possible mild disc bulge L4-5. MRI is recommended for further evaluation, if there are no contraindications.     Orders:    Ambulatory Referral to Neurosurgery    MRI lumbar spine without contrast; Future    Ambulatory Referral to Physical Therapy; Future  RTO 3 days to one week after MRI completion   Advised ti start PT and pain management ASAP   Ordered MRI lumbar given abnormalities on CT lumbar for a broad based disc bulge L 5 S1  and recommended MRI  for further evaluation. Patient with abtalgic gait and positive right straight leg raise.  Low back pain  As addressed in hPI  Orders:    Ambulatory Referral to Neurosurgery    MRI lumbar spine without contrast; Future    Ambulatory referral to Spine & Pain Management; Future    Ambulatory Referral to Physical Therapy; Future    Lumbosacral radiculopathy  As addressed in HPI.   Orders:    MRI lumbar spine without contrast; Future    Ambulatory referral to Spine & Pain Management; Future    Ambulatory Referral to Physical Therapy; Future        History of Present Illness   Initial visit , consult referral for ED for lumbar spine ,Right sided low back pain with distribution into right hip, bilateral groins, anterior lateral  right thigh into right knee, Has weakness in the right leg and foot. Reports multiple falls , is walking then get a sharp shooting pain in the right sided low back and hip with pain shooting into right thigh to knee then leg gives out. Has 2 falls in the past week. Has a longstanding history of back problems  that started after and incident at work about 4 years ago, bendt over then heard and felt a pop in the back Reports symptoms started to worsened about 1 month ago prior to ED visit, no precipitating event. She reports symptom quality as numbness, tingling, weaknes , difficultly walking, numbness in right knee. She also reports shooting from right low back into right posterior thigh  anterior lateral into rght knee.Reports symptoms aggravated with any type of back movement  down from the back into the right knee. Aggravating factors are also bending, prolonged sitting, walking, standing, and using stairs. She reports the severity of symptoms as 10/10, today 5-6/10.  She denies prior lumbar spine surgery.   She has visited emergency department 3 times for symptoms in the past month for low back and right knee pain    Multimodal Treatments::  PT -NONE ,  never   PM ---none never   Chiropractor --none     Social:  Single with 4 children, ages  27, 22, 18, 16  Work ---[employer],  PT   [employer]  [employer]   Smoke  ---7 cigarettes per pre day       HPI  Review of Systems   Constitutional: Negative.    HENT: Negative.     Eyes: Negative.    Respiratory: Negative.     Cardiovascular: Negative.    Gastrointestinal: Negative.    Endocrine: Negative.    Genitourinary: Negative.    Musculoskeletal:  Positive for back pain (lower right back pain radiates to right hip right buttock and down right legt to knee) and gait problem (due to weakness and pain hx falls x2 in last week).   Skin: Negative.    Allergic/Immunologic: Negative.    Neurological:  Positive for weakness (right leg and foot damion feels her right foot is  dragging at times) and numbness (right leg to knee).   Hematological: Negative.    Psychiatric/Behavioral:  Positive for sleep disturbance.     I have personally reviewed the MA's review of systems and made changes as necessary.    Past Medical History   Past Medical History:   Diagnosis Date    Blind left eye     Major depression     Prosthetic eye globe     Pyelonephritis     w/sepsis     Past Surgical History:   Procedure Laterality Date     SECTION      x3    EYE SURGERY       Family History   Problem Relation Age of Onset    Diabetes Family         Mellitus    Diabetes Mother         Mellitus      reports that she has been smoking cigarettes. She has a 1 pack-year smoking history. She has been exposed to tobacco smoke. She has never used smokeless tobacco. She reports current alcohol use. She reports that she does not use drugs.  Current Outpatient Medications on File Prior to Visit   Medication Sig Dispense Refill    acetaminophen (TYLENOL) 650 mg CR tablet Take 1 tablet (650 mg total) by mouth every 8 (eight) hours as needed for mild pain 30 tablet 0    cyclobenzaprine (FLEXERIL) 10 mg tablet Take 1 tablet (10 mg total) by mouth 2 (two) times a day as needed for muscle spasms 20 tablet 0    naproxen (Naprosyn) 500 mg tablet Take 1 tablet (500 mg total) by mouth 2 (two) times a day with meals 30 tablet 0    Risankizumab-rzaa (SKYRIZI IV) Inject into a catheter in a vein      Semaglutide-Weight Management (WEGOVY) 0.5 MG/0.5ML Inject 0.5 mL (0.5 mg total) under the skin once a week 2 mL 0    Diclofenac Sodium (VOLTAREN) 1 % Apply 2 g topically 4 (four) times a day (Patient not taking: Reported on 12/3/2024) 50 g 1    Diclofenac Sodium (VOLTAREN) 1 % Apply 2 g topically 4 (four) times a day (Patient not taking: Reported on 12/3/2024) 150 g 0    doxycycline (ORACEA) 40 MG capsule Take 1 capsule (40 mg total) by mouth every morning for 10 days 10 capsule 0    famotidine (PEPCID) 20 mg tablet Take 1  tablet (20 mg total) by mouth 2 (two) times a day (Patient not taking: Reported on 12/3/2024) 30 tablet 0    loratadine (CLARITIN) 10 mg tablet Take 1 tablet (10 mg total) by mouth daily (Patient not taking: Reported on 12/3/2024) 30 tablet 0    meloxicam (MOBIC) 15 mg tablet TAKE 1 TABLET(15 MG) BY MOUTH DAILY (Patient not taking: Reported on 12/3/2024) 90 tablet 0    methylPREDNISolone 4 MG tablet therapy pack Use as directed on package (Patient not taking: Reported on 12/3/2024) 21 each 0    oxyCODONE (Roxicodone) 5 immediate release tablet Take 1 tablet (5 mg total) by mouth every 6 (six) hours as needed for severe pain for up to 12 doses Max Daily Amount: 20 mg (Patient not taking: Reported on 12/3/2024) 12 tablet 0    predniSONE 50 mg tablet Take 100 mg by mouth daily (Patient not taking: Reported on 2021)       No current facility-administered medications on file prior to visit.     Allergies   Allergen Reactions    Morphine Sulfate Shortness Of Breath    Amoxicillin Hives    Clavulanic Acid Hives    Fentanyl     Lurasidone GI Intolerance    Morphine And Codeine       Pertinent Medical History          Medical History Reviewed by provider this encounter:     .  Past Medical History   Past Medical History:   Diagnosis Date    Blind left eye     Major depression     Prosthetic eye globe     Pyelonephritis     w/sepsis     Past Surgical History:   Procedure Laterality Date     SECTION      x3    EYE SURGERY       Family History   Problem Relation Age of Onset    Diabetes Family         Mellitus    Diabetes Mother         Mellitus      reports that she has been smoking cigarettes. She has a 1 pack-year smoking history. She has been exposed to tobacco smoke. She has never used smokeless tobacco. She reports current alcohol use. She reports that she does not use drugs.  Current Outpatient Medications on File Prior to Visit   Medication Sig Dispense Refill    acetaminophen (TYLENOL) 650 mg CR  tablet Take 1 tablet (650 mg total) by mouth every 8 (eight) hours as needed for mild pain 30 tablet 0    cyclobenzaprine (FLEXERIL) 10 mg tablet Take 1 tablet (10 mg total) by mouth 2 (two) times a day as needed for muscle spasms 20 tablet 0    naproxen (Naprosyn) 500 mg tablet Take 1 tablet (500 mg total) by mouth 2 (two) times a day with meals 30 tablet 0    Risankizumab-rzaa (SKYRIZI IV) Inject into a catheter in a vein      Semaglutide-Weight Management (WEGOVY) 0.5 MG/0.5ML Inject 0.5 mL (0.5 mg total) under the skin once a week 2 mL 0    Diclofenac Sodium (VOLTAREN) 1 % Apply 2 g topically 4 (four) times a day (Patient not taking: Reported on 12/3/2024) 50 g 1    Diclofenac Sodium (VOLTAREN) 1 % Apply 2 g topically 4 (four) times a day (Patient not taking: Reported on 12/3/2024) 150 g 0    doxycycline (ORACEA) 40 MG capsule Take 1 capsule (40 mg total) by mouth every morning for 10 days 10 capsule 0    famotidine (PEPCID) 20 mg tablet Take 1 tablet (20 mg total) by mouth 2 (two) times a day (Patient not taking: Reported on 12/3/2024) 30 tablet 0    loratadine (CLARITIN) 10 mg tablet Take 1 tablet (10 mg total) by mouth daily (Patient not taking: Reported on 12/3/2024) 30 tablet 0    meloxicam (MOBIC) 15 mg tablet TAKE 1 TABLET(15 MG) BY MOUTH DAILY (Patient not taking: Reported on 12/3/2024) 90 tablet 0    methylPREDNISolone 4 MG tablet therapy pack Use as directed on package (Patient not taking: Reported on 12/3/2024) 21 each 0    oxyCODONE (Roxicodone) 5 immediate release tablet Take 1 tablet (5 mg total) by mouth every 6 (six) hours as needed for severe pain for up to 12 doses Max Daily Amount: 20 mg (Patient not taking: Reported on 12/3/2024) 12 tablet 0    predniSONE 50 mg tablet Take 100 mg by mouth daily (Patient not taking: Reported on 8/16/2021)       No current facility-administered medications on file prior to visit.     Allergies   Allergen Reactions    Morphine Sulfate Shortness Of Breath     Amoxicillin Hives    Clavulanic Acid Hives    Fentanyl     Lurasidone GI Intolerance    Morphine And Codeine       Current Outpatient Medications on File Prior to Visit   Medication Sig Dispense Refill    acetaminophen (TYLENOL) 650 mg CR tablet Take 1 tablet (650 mg total) by mouth every 8 (eight) hours as needed for mild pain 30 tablet 0    cyclobenzaprine (FLEXERIL) 10 mg tablet Take 1 tablet (10 mg total) by mouth 2 (two) times a day as needed for muscle spasms 20 tablet 0    naproxen (Naprosyn) 500 mg tablet Take 1 tablet (500 mg total) by mouth 2 (two) times a day with meals 30 tablet 0    Risankizumab-rzaa (SKYRIZI IV) Inject into a catheter in a vein      Semaglutide-Weight Management (WEGOVY) 0.5 MG/0.5ML Inject 0.5 mL (0.5 mg total) under the skin once a week 2 mL 0    Diclofenac Sodium (VOLTAREN) 1 % Apply 2 g topically 4 (four) times a day (Patient not taking: Reported on 12/3/2024) 50 g 1    Diclofenac Sodium (VOLTAREN) 1 % Apply 2 g topically 4 (four) times a day (Patient not taking: Reported on 12/3/2024) 150 g 0    doxycycline (ORACEA) 40 MG capsule Take 1 capsule (40 mg total) by mouth every morning for 10 days 10 capsule 0    famotidine (PEPCID) 20 mg tablet Take 1 tablet (20 mg total) by mouth 2 (two) times a day (Patient not taking: Reported on 12/3/2024) 30 tablet 0    loratadine (CLARITIN) 10 mg tablet Take 1 tablet (10 mg total) by mouth daily (Patient not taking: Reported on 12/3/2024) 30 tablet 0    meloxicam (MOBIC) 15 mg tablet TAKE 1 TABLET(15 MG) BY MOUTH DAILY (Patient not taking: Reported on 12/3/2024) 90 tablet 0    methylPREDNISolone 4 MG tablet therapy pack Use as directed on package (Patient not taking: Reported on 12/3/2024) 21 each 0    oxyCODONE (Roxicodone) 5 immediate release tablet Take 1 tablet (5 mg total) by mouth every 6 (six) hours as needed for severe pain for up to 12 doses Max Daily Amount: 20 mg (Patient not taking: Reported on 12/3/2024) 12 tablet 0    predniSONE 50  "mg tablet Take 100 mg by mouth daily (Patient not taking: Reported on 2021)       No current facility-administered medications on file prior to visit.      Social History     Tobacco Use    Smoking status: Every Day     Current packs/day: 0.25     Average packs/day: 0.3 packs/day for 4.0 years (1.0 ttl pk-yrs)     Types: Cigarettes     Passive exposure: Current    Smokeless tobacco: Never   Vaping Use    Vaping status: Never Used   Substance and Sexual Activity    Alcohol use: Yes     Comment: occasional     Drug use: No    Sexual activity: Not on file        Objective   /76 (BP Location: Right arm, Patient Position: Sitting, Cuff Size: Standard)   Pulse 80   Temp (!) 97.2 °F (36.2 °C) (Temporal)   Resp 17   Ht 5' 2\" (1.575 m)   Wt 90.7 kg (200 lb)   SpO2 96%   BMI 36.58 kg/m²     Physical Exam  Vitals and nursing note reviewed.   Constitutional:       General: She is in acute distress (during ohysical movement and when ambulation , right sided pain).   Pulmonary:      Effort: Pulmonary effort is normal. No respiratory distress.   Neurological:      Mental Status: She is alert and oriented to person, place, and time.      Coordination: Heel to Shin Test normal.      Gait: Gait abnormal (antalgic). Tandem walk normal.      Deep Tendon Reflexes:      Reflex Scores:       Patellar reflexes are 2+ on the right side and 2+ on the left side.       Achilles reflexes are 2+ on the right side and 2+ on the left side.  Psychiatric:         Mood and Affect: Mood normal.         Behavior: Behavior normal.       Neurologic Exam     Mental Status   Oriented to person, place, and time.   Level of consciousness: alert    Motor Exam   Muscle bulk: normal  Overall muscle tone: normal  Right arm tone: normal    Strength   Right iliopsoas: 5/5  Left iliopsoas: 5/5  Right quadriceps: 5/5  Left quadriceps: 5/5  Right hamstrin/5  Left hamstrin/5  Right glutei: 5/5  Left glutei: 5/5  Right anterior tibial: " 5/5  Left anterior tibial: 5/5  Right posterior tibial: 5/5  Left posterior tibial: 5/5  Right peroneal: 5/5  Left peroneal: 5/5  Right gastroc: 5/5  Left gastroc: 5/5    Sensory Exam   Right leg light touch: normal  Left leg light touch: normal    Gait, Coordination, and Reflexes     Gait  Gait: (antalgic)    Coordination   Heel to shin coordination: normal  Tandem walking coordination: normal    Reflexes   Right patellar: 2+  Left patellar: 2+  Right achilles: 2+  Left achilles: 2+  Right ankle clonus: absent  Left ankle clonus: absentHeel Toe walk normal       SL AMB Radiology Results Review: I have reviewed radiology reports from PACS/EPIC including: CT lumbar recon .    Administrative Statements   I have spent a total time of 60 minutes in caring for this patient on the day of the visit/encounter including Diagnostic results, Risks and benefits of tx options, Instructions for management, Patient and family education, Importance of tx compliance, Risk factor reductions, Impressions, Counseling / Coordination of care, Documenting in the medical record, Reviewing / ordering tests, medicine, procedures  , Obtaining or reviewing history  , and Communicating with other healthcare professionals .

## 2024-12-06 ENCOUNTER — TELEPHONE (OUTPATIENT)
Age: 44
End: 2024-12-06

## 2024-12-06 NOTE — TELEPHONE ENCOUNTER
Patient called in to reschedule missed NV from 12/2.  She also mentioned that she will take her last shot of Wegovy on Sunday.  She said that she feels like it is not working for her at all.  She asked if a nurse could call her back at  (her daughter's phone, she is without a phone right now).

## 2024-12-09 NOTE — TELEPHONE ENCOUNTER
She is still on the starting doses of wegovy. She should be on the 0.5mg now.  Usually people notice a bigger change on the 1mg.  Can you confirm the dose with her

## 2024-12-10 NOTE — TELEPHONE ENCOUNTER
Patient states that she has been taking the 0.25mg dose. Patient checked the vial while on the phone and it was confirmed that she is taking the 0.25mg.   Patient does not feel like she is eating big portions but is picking more throughout the day.   Patient weight is 200lbs   Daughter's number [phone]

## 2024-12-10 NOTE — TELEPHONE ENCOUNTER
Called daughters phone number provided and informed her that 0.5mg was sent over to pharmacy and to finish out what she had of the 0.25mg first.

## 2024-12-11 ENCOUNTER — CLINICAL SUPPORT (OUTPATIENT)
Dept: BARIATRICS | Facility: CLINIC | Age: 44
End: 2024-12-11

## 2024-12-11 VITALS
RESPIRATION RATE: 16 BRPM | DIASTOLIC BLOOD PRESSURE: 78 MMHG | SYSTOLIC BLOOD PRESSURE: 120 MMHG | BODY MASS INDEX: 36.33 KG/M2 | TEMPERATURE: 98.8 F | HEART RATE: 98 BPM | HEIGHT: 62 IN | WEIGHT: 197.4 LBS

## 2024-12-11 DIAGNOSIS — R63.5 ABNORMAL WEIGHT GAIN: Primary | ICD-10-CM

## 2024-12-11 PROCEDURE — RECHECK

## 2024-12-11 NOTE — PROGRESS NOTES
Patient last visit weight: 201lbs   Patient current visit weight: 197.4lbs     If you are taking phentermine or other oral weight loss medications, are you experiencing any of the following symptoms:  Headache:   Blurred Vision:   Chest Pain:   Palpitations:  Insomnia:   SPECIFY ORAL MEDICATION AND DOSAGE:     If you are taking an injectable medication,  are you experiencing any of the following symptoms:  Bloating: NO   Nausea: NO   Vomiting: NO   Constipation: YES- states its tolerable   Diarrhea: NO   SPECIFY INJECTABLE MEDICATION AND CURRENT DOSAGE: Wegovy 0.5mg.   Vitals:    Is BP less than 100/60? NO   Is BP greater than 140/90? NO   Is HR greater than 100? NO   **If yes to any of the above, have patient relax and repeat in 5-10 minutes**    Repeat values:    Is BP less than 100/60?  Is BP greater than 140/90?  Is HR greater than 100?  **If values remain outside of ranges above, please consult provider for next steps**      Date of last injection:    How many injections do you have left:

## 2024-12-12 DIAGNOSIS — E66.811 OBESITY (BMI 30.0-34.9): Primary | ICD-10-CM

## 2025-01-02 ENCOUNTER — TELEPHONE (OUTPATIENT)
Dept: FAMILY MEDICINE CLINIC | Facility: CLINIC | Age: 45
End: 2025-01-02

## 2025-01-02 NOTE — TELEPHONE ENCOUNTER
PCP SIGNATURE NEEDED FOR Request for Medicare Prescription Drug Coverage Determination FORM RECEIVED VIA FAX AND PLACED IN PCP FOLDER TO BE DELIVERED AT ASSIGNED TIMES.

## 2025-01-06 ENCOUNTER — TELEPHONE (OUTPATIENT)
Dept: NEUROSURGERY | Facility: CLINIC | Age: 45
End: 2025-01-06

## 2025-01-06 NOTE — TELEPHONE ENCOUNTER
Called patient and left  informing her that her follow up appointment on 1/6/25 with Alfreda will be cancelled due to her not showing up for her MRI. I left the central scheduling number along with the office number to call and reschedule both appointments.   [FreeTextEntry1] : testicular hypofunction \par check cmp, cbc , testosterone panel. FSh/lh , PRL\par he complains also for nipple sensitivity \par might consider pituitary MRI \par i suspect it started with [employer] supplements (possible aromatase -inhibitors in it ??). \par will call pt with results. \par \par \par

## 2025-01-07 NOTE — TELEPHONE ENCOUNTER
FAXED ON 01/07/25 TO [facility] at [phone]. FAX CONFIRMATION RECEIVED.

## 2025-01-13 DIAGNOSIS — E66.811 OBESITY (BMI 30.0-34.9): Primary | ICD-10-CM

## 2025-01-16 ENCOUNTER — TELEPHONE (OUTPATIENT)
Dept: BARIATRICS | Facility: CLINIC | Age: 45
End: 2025-01-16

## 2025-01-16 ENCOUNTER — PREP FOR PROCEDURE (OUTPATIENT)
Age: 45
End: 2025-01-16

## 2025-01-16 ENCOUNTER — TELEPHONE (OUTPATIENT)
Age: 45
End: 2025-01-16

## 2025-01-16 ENCOUNTER — TELEPHONE (OUTPATIENT)
Dept: OTHER | Facility: HOSPITAL | Age: 45
End: 2025-01-16

## 2025-01-16 DIAGNOSIS — Z12.11 SCREENING FOR COLON CANCER: Primary | ICD-10-CM

## 2025-01-16 NOTE — TELEPHONE ENCOUNTER
Called pt and she said she would reach out to her insurance asking about her health and wellness plan and if she is still a member.

## 2025-01-16 NOTE — TELEPHONE ENCOUNTER
Provided [facility] pharmacy and updated pts insurance to pharmacy. Stated they would reach out to pt when medication is ready.

## 2025-01-16 NOTE — TELEPHONE ENCOUNTER
Pt calling back stating that she only has [employer] insurance. Pt is unaware that she has a 2nd coverage plan.

## 2025-01-16 NOTE — TELEPHONE ENCOUNTER
Patient called the RX Refill Line. Message is being forwarded to the office.     Pharmacy told patient told her that her insurance does not cover the Wegovy. Would like to know if there is something else that the doctor recommends she takes.     Please contact patient at [phone] to let her know.

## 2025-01-16 NOTE — TELEPHONE ENCOUNTER
Patient called requesting refill for Wegovy. Patient made aware medication was refilled on 1/13/2025 for 2 mL with 0 refills to [facility] pharmacy. Patient instructed to contact the pharmacy to obtain refills of medication. Patient verbalized understanding.

## 2025-01-16 NOTE — TELEPHONE ENCOUNTER
Scheduled date of colonoscopy (as of today):1/30/25    Physician performing colonoscopy:Dr Jaiyeola    Location of colonoscopy:[place]    Bowel prep reviewed with patient:miralax/dulcolax    Instructions reviewed with patient by:prep instructions sent via email on chart    Clearances: N/A

## 2025-01-16 NOTE — TELEPHONE ENCOUNTER
Spoke with pharmacy to re-run script through patients [place] [employer] [place] since there is an approval on file untiil April 2025. Pharmacy does not see that the patient has that active insurance, only her [employer] plan, which medicare does not cover.    LMOM for patient asking her if she still has [employer] insurance and to give us a call back.

## 2025-01-16 NOTE — TELEPHONE ENCOUNTER
Can you check on the insurance problem. It should be covered with her 2nd plan Paperwork is completed by provider and faxed back as requested

## 2025-01-16 NOTE — TELEPHONE ENCOUNTER
25  Screened by: Suly Baldwin    Referring Provider Dr Cody    Pre- Screenin' 2 197 BMI 36.10    Has patient been referred for a routine screening Colonoscopy? yes  Is the patient between 45-75 years old? yes      Previous Colonoscopy no   If yes:    Date:     Facility:     Reason:         Does the patient want to see a Gastroenterologist prior to their procedure OR are they having any GI symptoms? no    Has the patient been hospitalized or had abdominal surgery in the past 6 months? no    Does the patient use supplemental oxygen? no    Does the patient take Coumadin, Lovenox, Plavix, Elliquis, Xarelto, or other blood thinning medication? no    Has the patient had a stroke, cardiac event, or stent placed in the past year? no      If patient is between 45yrs - 49yrs, please advise patient that we will have to confirm benefits & coverage with their insurance company for a routine screening colonoscopy.

## 2025-01-16 NOTE — TELEPHONE ENCOUNTER
Patient called the RX Refill Line. Message is being forwarded to the office.     Patient called back stating she spoke to insurance and they stated she is still a member for her health and wellness plan since 3/2022 her ID number is [id number]    Please contact patient at [phone]

## 2025-01-21 ENCOUNTER — TELEPHONE (OUTPATIENT)
Age: 45
End: 2025-01-21

## 2025-01-21 ENCOUNTER — TELEPHONE (OUTPATIENT)
Dept: GASTROENTEROLOGY | Facility: MEDICAL CENTER | Age: 45
End: 2025-01-21

## 2025-01-21 ENCOUNTER — TELEPHONE (OUTPATIENT)
Dept: BARIATRICS | Facility: CLINIC | Age: 45
End: 2025-01-21

## 2025-01-21 NOTE — TELEPHONE ENCOUNTER
Left voicemail and requested call back     Called patient to confirm procedure for 01/30/2025 with Dr. Jaiyeola at [facility], asked to please give us a call to confirm. Patient can confirm procedure on MYCHART will attach the instructions for procedure

## 2025-01-21 NOTE — TELEPHONE ENCOUNTER
Patient called rx refill line inquiring a refill on Wegovy. Patient informed that prescription was sent 1/13/25 with 2mLs to [facility]. Patient going to contact pharmacy to obtain prescription.

## 2025-01-21 NOTE — TELEPHONE ENCOUNTER
[employer] [place] other dosing.     Reason for call:   [x] Prior Auth  [] Other:     Caller:  [x] Patient  [] Pharmacy  Name:   Address:   Callback Number:     Medication: Semaglutide-Weight Management (WEGOVY) 1 MG/0.5ML     Dose/Frequency: Sig: Inject 0.5 mL (1 mg total) under the skin once a week    Quantity: 2mL    Ordering Provider:   [] PCP/Provider -   [x] Speciality/Provider - weight management /Salvador Hairston PA-C  [address] [facility] [address], [place] [address]  Phone: [phone]   Fax: [phone]  AUDRA #: [id number]   NPI: [id number]

## 2025-01-22 NOTE — TELEPHONE ENCOUNTER
Patient called the med refill dept she is asking to find out what is going on with her Wegovy refill-  Please call patient: [phone]

## 2025-01-24 ENCOUNTER — ANESTHESIA EVENT (OUTPATIENT)
Dept: ANESTHESIOLOGY | Facility: HOSPITAL | Age: 45
End: 2025-01-24

## 2025-01-24 ENCOUNTER — ANESTHESIA (OUTPATIENT)
Dept: ANESTHESIOLOGY | Facility: HOSPITAL | Age: 45
End: 2025-01-24

## 2025-01-29 RX ORDER — SODIUM CHLORIDE, SODIUM LACTATE, POTASSIUM CHLORIDE, CALCIUM CHLORIDE 600; 310; 30; 20 MG/100ML; MG/100ML; MG/100ML; MG/100ML
125 INJECTION, SOLUTION INTRAVENOUS CONTINUOUS
OUTPATIENT
Start: 2025-01-29

## 2025-01-29 NOTE — TELEPHONE ENCOUNTER
LMOM for patient letting her know that Wegovy 1mg was sent on 1/13 by Salvador and that she is approved through her [place] health [place] until April 2025 and asked her to call [facility] to re-run the script through that insurance and it should be approved. Asked her to call back if she is still having trouble.

## 2025-02-03 ENCOUNTER — TELEPHONE (OUTPATIENT)
Dept: BARIATRICS | Facility: CLINIC | Age: 45
End: 2025-02-03

## 2025-02-03 NOTE — TELEPHONE ENCOUNTER
Patient called the RX Refill Line. Message is being forwarded to the office.     Patient returned call stating she checked with her pharmacy and they are still having trouble filling the medication. Patient states was told that additional information is still needed. Patient would like pharmacy contacted and would like phone call with update.

## 2025-02-03 NOTE — TELEPHONE ENCOUNTER
Pt contacted to reschedule nurse visit for 2/3 due to no show. Pt will call back when able to be rescheduled

## 2025-02-04 ENCOUNTER — TELEPHONE (OUTPATIENT)
Dept: GASTROENTEROLOGY | Facility: MEDICAL CENTER | Age: 45
End: 2025-02-04

## 2025-02-04 NOTE — TELEPHONE ENCOUNTER
Left voicemail and requested call back     Called patient to reschedule procedure   (colonoscopy), asked to please give us a call to reschedule

## 2025-02-04 NOTE — TELEPHONE ENCOUNTER
Called pharmacy, provided them with patients insurance information. They ran it through and it was approved. They stated they would call the patient once prescription is ready.

## 2025-02-04 NOTE — TELEPHONE ENCOUNTER
Patient called the RX Refill Line. Message is being forwarded to the office.     Patient is requesting an update. She understands about the script being sent on 1/13 and being approved through her [place] [employer] [place] but she is still having trouble with the pharmacy.     Please contact patient at [phone] when done.

## 2025-02-15 DIAGNOSIS — E66.811 OBESITY (BMI 30.0-34.9): ICD-10-CM

## 2025-02-18 DIAGNOSIS — E66.811 OBESITY (BMI 30.0-34.9): Primary | ICD-10-CM

## 2025-02-18 RX ORDER — SEMAGLUTIDE 1 MG/.5ML
INJECTION, SOLUTION SUBCUTANEOUS
Qty: 2 ML | Refills: 0 | OUTPATIENT
Start: 2025-02-18

## 2025-02-27 DIAGNOSIS — E66.811 OBESITY (BMI 30.0-34.9): ICD-10-CM

## 2025-02-27 NOTE — TELEPHONE ENCOUNTER
Patient called the RX Refill Line. Message is being forwarded to the office.     Patient is requesting a new script for the wegovy but according to the pharmacy, has to be brand only  I clicked ABDELRAHMAN and resent it but it still went over as generic so I resent it again but this time wrote name brand only on the script  Pt will wait a little, then check with the pharmacy to see if that worked and will call back if a PA isn't need anymore or if a PA for brand is needed

## 2025-02-28 DIAGNOSIS — E66.811 OBESITY (BMI 30.0-34.9): ICD-10-CM

## 2025-02-28 NOTE — TELEPHONE ENCOUNTER
Patient called the RX Refill Line. Message is being forwarded to the office.     Patient is requesting a message be sent to the office regarding her medication of Semaglutide-Weight Management (WEGOVY) 1.7 MG/0.75ML. Patient stated the medication was to be sent to [facility] - [facility] [facility] [address].    Please review.     Please contact patient at [phone], once completed as per patient request.

## 2025-03-24 NOTE — TELEPHONE ENCOUNTER
Patient called requesting refill for wegovy. Patient made aware medication was refilled on 2/28 for 3 ml with 2 refills to [facility] pharmacy. Patient instructed to contact the pharmacy to obtain refills of medication. Patient verbalized understanding.

## 2025-03-31 ENCOUNTER — TELEPHONE (OUTPATIENT)
Dept: BARIATRICS | Facility: CLINIC | Age: 45
End: 2025-03-31

## 2025-03-31 NOTE — TELEPHONE ENCOUNTER
PA for Wegovy 1.7mg SUBMITTED to [employer]     via    [x]CMM-KEY: [id number]  []Surescripts-Case ID #   []Availity-Auth ID # NDC #   []Faxed to plan   []Other website   []Phone call Case ID #     []PA sent as URGENT    All office notes, labs and other pertaining documents and studies sent. Clinical questions answered. Awaiting determination from insurance company.     Turnaround time for your insurance to make a decision on your Prior Authorization can take 7-21 business days.

## 2025-04-02 DIAGNOSIS — E66.811 OBESITY (BMI 30.0-34.9): ICD-10-CM

## 2025-04-02 RX ORDER — SEMAGLUTIDE 1.7 MG/.75ML
INJECTION, SOLUTION SUBCUTANEOUS
Qty: 3 ML | Refills: 2 | Status: SHIPPED | OUTPATIENT
Start: 2025-04-02

## 2025-04-21 ENCOUNTER — TELEPHONE (OUTPATIENT)
Dept: BARIATRICS | Facility: CLINIC | Age: 45
End: 2025-04-21

## 2025-04-21 DIAGNOSIS — E66.811 OBESITY (BMI 30.0-34.9): ICD-10-CM

## 2025-04-21 NOTE — TELEPHONE ENCOUNTER
"Patient is calling requesting refill for Wegovy 1.7 MG/0.75ML. medication was sent 25 with 2 refills. Patient states pharmacy told her that the medication/script is \"\". I don't see any indication of that in the chart. Patient is asking office to call pharmacy to ask why medication is \"\".  "

## 2025-04-22 RX ORDER — SEMAGLUTIDE 1.7 MG/.75ML
INJECTION, SOLUTION SUBCUTANEOUS
Qty: 3 ML | Refills: 1 | Status: SHIPPED | OUTPATIENT
Start: 2025-04-22

## 2025-04-22 NOTE — TELEPHONE ENCOUNTER
Patient called to request a refill for their   Semaglutide-Weight Management 1.7 MG/0.75M advised a refill was requested on 4/21/25 and is pending approval. Patient verbalized understanding and is in agreement.     Does the patient have enough for 3 days?   [] Yes   [x] No - Send as HP to POD   patient due for next injection on Friday

## 2025-04-28 ENCOUNTER — TELEPHONE (OUTPATIENT)
Dept: BARIATRICS | Facility: CLINIC | Age: 45
End: 2025-04-28

## 2025-04-28 NOTE — TELEPHONE ENCOUNTER
Reason for call:   [x] Prior Auth  [] Other:     Caller:  [] Patient  [x] Pharmacy  Name:   Address:   Callback Number:     Medication: Wegovy 1.7 MG/0.75ML     Dose/Frequency:  INJECT 0.75 ML (1.7 MG TOTAL) UNDER THE SKIN ONCE A WEEK,     Quantity: 3 mL  3 mL  Ordering Provider:   [] PCP/Provider -   [x] Speciality/Provider - Weight Management    [facility] - [place] [address] [facility] [phone]

## 2025-05-05 NOTE — TELEPHONE ENCOUNTER
Patient called to get an update on her Wegovy prescription. She was told by her insurance that it was denied. Patient was made aware that documentation in her chart shows that a prior auth was submitted 03/31/25. An attempt was made to warm transfer patient to clinical staff. Writer was told that they are still waiting for determination from the insurance.    Patient has been without medication for at least a week. Please contact her insurance to confirm whether or nor the prior auth has been received. [facility] is holding the medication for her and is ready to fill it when it's been approved.    Please follow up with patient at phone #[phone]

## 2025-05-05 NOTE — TELEPHONE ENCOUNTER
Patient called the RX Refill Line. Message is being forwarded to the office.     Patient is requesting information about the prior auth for her Wegovy. I see this was sent a week ago to the prior auth dept.     Please contact patient at  [phone]. She would like an update

## 2025-05-12 ENCOUNTER — TELEPHONE (OUTPATIENT)
Dept: BARIATRICS | Facility: CLINIC | Age: 45
End: 2025-05-12

## 2025-06-03 ENCOUNTER — OFFICE VISIT (OUTPATIENT)
Dept: BARIATRICS | Facility: CLINIC | Age: 45
End: 2025-06-03
Payer: COMMERCIAL

## 2025-06-03 VITALS
WEIGHT: 189.8 LBS | HEIGHT: 62 IN | TEMPERATURE: 97.5 F | HEART RATE: 101 BPM | BODY MASS INDEX: 34.93 KG/M2 | OXYGEN SATURATION: 95 % | DIASTOLIC BLOOD PRESSURE: 64 MMHG | SYSTOLIC BLOOD PRESSURE: 122 MMHG

## 2025-06-03 DIAGNOSIS — E66.811 OBESITY (BMI 30.0-34.9): Primary | ICD-10-CM

## 2025-06-03 DIAGNOSIS — R63.5 ABNORMAL WEIGHT GAIN: ICD-10-CM

## 2025-06-03 PROCEDURE — 99214 OFFICE O/P EST MOD 30 MIN: CPT | Performed by: PHYSICIAN ASSISTANT

## 2025-06-03 RX ORDER — TIRZEPATIDE 2.5 MG/.5ML
2.5 INJECTION, SOLUTION SUBCUTANEOUS WEEKLY
Qty: 2 ML | Refills: 0 | Status: SHIPPED | OUTPATIENT
Start: 2025-06-03 | End: 2025-07-01

## 2025-06-03 RX ORDER — TIRZEPATIDE 2.5 MG/.5ML
2.5 INJECTION, SOLUTION SUBCUTANEOUS WEEKLY
Qty: 2 ML | Refills: 0 | Status: SHIPPED | OUTPATIENT
Start: 2025-06-03 | End: 2025-06-03 | Stop reason: SDUPTHER

## 2025-06-03 NOTE — ASSESSMENT & PLAN NOTE
-Patient is pursuing Conservative Program  -Calorie goals, sample menu (0704-8098 calories), portion size guidelines, and food logging reviewed with the patient.    -Initial weight loss goal of 5-10% weight loss for improved health  -Screening labs and records reviewed from prior and recommend doing rechecks    Goals:  -Food log (ie.) [email or web address],[facility],[email or web address]  - doing well to  drink at least 64oz of water daily.No sugary beverages  -recommend to eat 3 meals daily to avoid grazing  -recommend starting strength training at least 1 x a week    On wegovy 1.7mg.  Start weight 201.8lb.  6% weight loss. She does not feel any change in appetite. Discussed possible switch or increasing to 2.4mg. She will start on zepbound. Side effect reviewd  Past medications: phentermine-vomiting, topamax-tingling  Medication agreement signed 10/1/24      Initial Weight:201.8  Current Weight: 189.8  Change: -12lb   Goal Weight:150

## 2025-06-04 ENCOUNTER — TELEPHONE (OUTPATIENT)
Dept: BARIATRICS | Facility: CLINIC | Age: 45
End: 2025-06-04

## 2025-06-04 NOTE — TELEPHONE ENCOUNTER
PA for Zepbound 2.5 mg SUBMITTED to [employer]    via    [x]CMM-KEY: [id number]  []Surescripts-Case ID #   []Availity-Auth ID # NDC #   []Faxed to plan   []Other website   []Phone call Case ID #     []PA sent as URGENT    All office notes, labs and other pertaining documents and studies sent. Clinical questions answered. Awaiting determination from insurance company.     Turnaround time for your insurance to make a decision on your Prior Authorization can take 7-21 business days.

## 2025-06-05 NOTE — TELEPHONE ENCOUNTER
PA for Zepbound 2.5 mg DENIED    Reason: plan exclusion        Message sent to office clinical pool Yes    Denial letter scanned into Media Yes    We can gladly do an appeal but the process can take about 30-60 days to provide determination. Please have the office staff schedule a Peer to Peer at phone [phone]. If an appeal is truly warranted please have Provider send clinical documentation to the PA department to support the appeal.     **Please follow up with your patient regarding denial and next steps**

## 2025-06-06 NOTE — TELEPHONE ENCOUNTER
PA for Zepbound 2.5 mg SUBMITTED to [employer]    via    [x]CMM-KEY: [id number]  []Surescripts-Case ID #   []Availity-Auth ID #  NDC #   []Faxed to plan   []Other website   []Phone call Case ID #     []PA sent as URGENT    All office notes, labs and other pertaining documents and studies sent. Clinical questions answered. Awaiting determination from insurance company.     Turnaround time for your insurance to make a decision on your Prior Authorization can take 7-21 business days.

## 2025-06-06 NOTE — TELEPHONE ENCOUNTER
PA for Zepbound 2.5 mg APPROVED     Date(s) approved 6/6/2025-12/3/2025      Patient advised by          [x]MyChart Message  []Phone call   []LMOM  []L/M to call office as no active Communication consent on file  []Unable to leave detailed message as VM not approved on Communication consent       Pharmacy advised by    [x]Fax  []Phone call  []Secure Chat    Specialty Pharmacy    []     Approval letter scanned into Media Yes

## 2025-07-08 ENCOUNTER — TELEPHONE (OUTPATIENT)
Dept: BARIATRICS | Facility: CLINIC | Age: 45
End: 2025-07-08

## 2025-07-08 NOTE — TELEPHONE ENCOUNTER
Reason for call:   [x] Refill   [] Prior Auth  [x] Other: Patient is tolerating medication well and would like to titrate to the next dose. Patient stated constantly feels hungry and doesn't feel like current dose is working. Patient doesn't feel like she is losing any weight. Next dose is scheduled for Tuesday 7/15/25.    Office:   [] PCP/Provider -   [x] Specialty/Provider - [facility] - Salvador Hairston PA-C     Medication: tirzepatide (Zepbound) 2.5 mg/0.5 mL auto-injector  Inject 0.5 mL (2.5 mg total) under the skin once a week for 28 days     Quantity: 28 mL    Pharmacy: [id number] - [place] - [address]     Local Pharmacy   Does the patient have enough for 3 days?   [x] Yes    [] No - Send as HP to POD    Mail Away Pharmacy   Does the patient have enough for 10 days?   [] Yes   [] No - Send as HP to POD     How are you tolerating the medication?   [] Nausea  [] Vomiting  [] Diarrhea  [] Asymptomatic  [x] Other: Patient is doing well on the patient. Interested in increasing dose. Feeling constantly hungry.      Last visit weight:189 lb     Current weight: 189 lb    Date of last injection: 7/7/25    How many injections do you have left: 0 (one of the pens got damage by the needle was unable to use)

## 2025-07-09 DIAGNOSIS — E66.811 OBESITY (BMI 30.0-34.9): Primary | ICD-10-CM

## 2025-07-09 RX ORDER — TIRZEPATIDE 5 MG/.5ML
5 INJECTION, SOLUTION SUBCUTANEOUS WEEKLY
Qty: 2 ML | Refills: 2 | Status: SHIPPED | OUTPATIENT
Start: 2025-07-09

## 2025-07-11 ENCOUNTER — TELEPHONE (OUTPATIENT)
Dept: BARIATRICS | Facility: CLINIC | Age: 45
End: 2025-07-11

## 2025-07-11 NOTE — TELEPHONE ENCOUNTER
Deedee from the med refill line called because patient was told that she needed a prior auth for her Zepbound refill that was sent to pharmacy on 07/09.  Please call patient to discuss because there is a prior auth approval

## 2025-07-11 NOTE — TELEPHONE ENCOUNTER
Patient called requesting refill for zepbound. Patient made aware medication was refilled on 7/9/25 for 2 mL with 2 refills to [facility] pharmacy. Patient instructed to contact the pharmacy and speak with someone directly to obtain refills of medication. Patient advised to call back for refill if their pharmacy is unable to assist them. Patient verbalized understanding.

## 2025-07-11 NOTE — TELEPHONE ENCOUNTER
Patient called and explained that when she called [facility] pharmacy that they require a prior authorization. Patient stated that she does not have any more pens left. Her last injection was on Tuesday 7/8. I spoke to Mariana to make aware.

## 2025-07-12 DIAGNOSIS — M54.50 ACUTE LOW BACK PAIN: ICD-10-CM

## 2025-07-14 RX ORDER — SENNOSIDES 8.6 MG
650 CAPSULE ORAL EVERY 8 HOURS PRN
Qty: 30 TABLET | Refills: 0 | OUTPATIENT
Start: 2025-07-14

## 2025-08-04 DIAGNOSIS — E66.811 OBESITY (BMI 30.0-34.9): ICD-10-CM

## 2025-08-05 DIAGNOSIS — E66.811 OBESITY (BMI 30.0-34.9): Primary | ICD-10-CM

## 2025-08-05 RX ORDER — TIRZEPATIDE 7.5 MG/.5ML
7.5 INJECTION, SOLUTION SUBCUTANEOUS WEEKLY
Qty: 2 ML | Refills: 1 | Status: SHIPPED | OUTPATIENT
Start: 2025-08-05 | End: 2025-09-30

## 2025-08-05 RX ORDER — TIRZEPATIDE 7.5 MG/.5ML
7.5 INJECTION, SOLUTION SUBCUTANEOUS WEEKLY
Qty: 2 ML | Refills: 1 | Status: SHIPPED | OUTPATIENT
Start: 2025-08-05 | End: 2025-08-05 | Stop reason: SDUPTHER

## 2025-08-05 RX ORDER — TIRZEPATIDE 5 MG/.5ML
5 INJECTION, SOLUTION SUBCUTANEOUS WEEKLY
Qty: 2 ML | Refills: 2 | OUTPATIENT
Start: 2025-08-05

## 2025-08-08 ENCOUNTER — TELEPHONE (OUTPATIENT)
Age: 45
End: 2025-08-08